# Patient Record
Sex: FEMALE | HISPANIC OR LATINO | Employment: OTHER
[De-identification: names, ages, dates, MRNs, and addresses within clinical notes are randomized per-mention and may not be internally consistent; named-entity substitution may affect disease eponyms.]

---

## 2017-07-29 ENCOUNTER — HEALTH MAINTENANCE LETTER (OUTPATIENT)
Age: 47
End: 2017-07-29

## 2022-12-30 ENCOUNTER — HOSPITAL ENCOUNTER (EMERGENCY)
Facility: CLINIC | Age: 52
Discharge: HOME | End: 2022-12-31
Attending: INTERNAL MEDICINE | Admitting: INTERNAL MEDICINE
Payer: COMMERCIAL

## 2022-12-30 DIAGNOSIS — R10.84 ABDOMINAL PAIN, GENERALIZED: ICD-10-CM

## 2022-12-30 DIAGNOSIS — R19.7 DIARRHEA, UNSPECIFIED TYPE: ICD-10-CM

## 2022-12-30 PROCEDURE — 99285 EMERGENCY DEPT VISIT HI MDM: CPT | Mod: 25

## 2022-12-30 PROCEDURE — 96361 HYDRATE IV INFUSION ADD-ON: CPT

## 2022-12-30 PROCEDURE — 96374 THER/PROPH/DIAG INJ IV PUSH: CPT | Mod: 59

## 2022-12-30 PROCEDURE — 96375 TX/PRO/DX INJ NEW DRUG ADDON: CPT

## 2022-12-30 PROCEDURE — 99284 EMERGENCY DEPT VISIT MOD MDM: CPT | Performed by: INTERNAL MEDICINE

## 2022-12-30 RX ORDER — KETOROLAC TROMETHAMINE 30 MG/ML
30 INJECTION, SOLUTION INTRAMUSCULAR; INTRAVENOUS ONCE
Status: COMPLETED | OUTPATIENT
Start: 2022-12-30 | End: 2022-12-31

## 2022-12-30 RX ORDER — SODIUM CHLORIDE 9 MG/ML
INJECTION, SOLUTION INTRAVENOUS CONTINUOUS
Status: DISCONTINUED | OUTPATIENT
Start: 2022-12-31 | End: 2022-12-31 | Stop reason: HOSPADM

## 2022-12-30 RX ORDER — METOCLOPRAMIDE HYDROCHLORIDE 5 MG/ML
5 INJECTION INTRAMUSCULAR; INTRAVENOUS ONCE
Status: COMPLETED | OUTPATIENT
Start: 2022-12-30 | End: 2022-12-31

## 2022-12-31 ENCOUNTER — APPOINTMENT (OUTPATIENT)
Dept: CT IMAGING | Facility: CLINIC | Age: 52
End: 2022-12-31
Attending: INTERNAL MEDICINE
Payer: COMMERCIAL

## 2022-12-31 VITALS
RESPIRATION RATE: 16 BRPM | HEART RATE: 94 BPM | SYSTOLIC BLOOD PRESSURE: 105 MMHG | DIASTOLIC BLOOD PRESSURE: 77 MMHG | OXYGEN SATURATION: 97 % | TEMPERATURE: 97.5 F | WEIGHT: 177 LBS

## 2022-12-31 LAB
ALBUMIN SERPL-MCNC: 3.8 G/DL (ref 3.4–5)
ALBUMIN UR-MCNC: NEGATIVE MG/DL
ALP SERPL-CCNC: 95 U/L (ref 40–150)
ALT SERPL W P-5'-P-CCNC: 32 U/L (ref 0–50)
ANION GAP SERPL CALCULATED.3IONS-SCNC: 4 MMOL/L (ref 3–14)
APPEARANCE UR: CLEAR
AST SERPL W P-5'-P-CCNC: 22 U/L (ref 0–45)
BASOPHILS # BLD AUTO: 0 10E3/UL (ref 0–0.2)
BASOPHILS NFR BLD AUTO: 1 %
BILIRUB SERPL-MCNC: 0.3 MG/DL (ref 0.2–1.3)
BILIRUB UR QL STRIP: NEGATIVE
BUN SERPL-MCNC: 17 MG/DL (ref 7–30)
CALCIUM SERPL-MCNC: 9.3 MG/DL (ref 8.5–10.1)
CHLORIDE BLD-SCNC: 108 MMOL/L (ref 94–109)
CO2 SERPL-SCNC: 28 MMOL/L (ref 20–32)
COLOR UR AUTO: ABNORMAL
CREAT SERPL-MCNC: 0.81 MG/DL (ref 0.52–1.04)
CRP SERPL-MCNC: <2.9 MG/L (ref 0–8)
EOSINOPHIL # BLD AUTO: 0.1 10E3/UL (ref 0–0.7)
EOSINOPHIL NFR BLD AUTO: 1 %
ERYTHROCYTE [DISTWIDTH] IN BLOOD BY AUTOMATED COUNT: 13.4 % (ref 10–15)
ERYTHROCYTE [SEDIMENTATION RATE] IN BLOOD BY WESTERGREN METHOD: 38 MM/HR (ref 0–30)
GFR SERPL CREATININE-BSD FRML MDRD: 86 ML/MIN/1.73M2
GLUCOSE BLD-MCNC: 98 MG/DL (ref 70–99)
GLUCOSE UR STRIP-MCNC: NEGATIVE MG/DL
HCT VFR BLD AUTO: 37 % (ref 35–47)
HGB BLD-MCNC: 11.6 G/DL (ref 11.7–15.7)
HGB UR QL STRIP: NEGATIVE
IMM GRANULOCYTES # BLD: 0 10E3/UL
IMM GRANULOCYTES NFR BLD: 0 %
INR PPP: 0.9 (ref 0.85–1.15)
KETONES UR STRIP-MCNC: NEGATIVE MG/DL
LEUKOCYTE ESTERASE UR QL STRIP: ABNORMAL
LIPASE SERPL-CCNC: 130 U/L (ref 73–393)
LYMPHOCYTES # BLD AUTO: 2.2 10E3/UL (ref 0.8–5.3)
LYMPHOCYTES NFR BLD AUTO: 37 %
MCH RBC QN AUTO: 26.9 PG (ref 26.5–33)
MCHC RBC AUTO-ENTMCNC: 31.4 G/DL (ref 31.5–36.5)
MCV RBC AUTO: 86 FL (ref 78–100)
MONOCYTES # BLD AUTO: 0.5 10E3/UL (ref 0–1.3)
MONOCYTES NFR BLD AUTO: 9 %
MUCOUS THREADS #/AREA URNS LPF: PRESENT /LPF
NEUTROPHILS # BLD AUTO: 3.2 10E3/UL (ref 1.6–8.3)
NEUTROPHILS NFR BLD AUTO: 52 %
NITRATE UR QL: NEGATIVE
NRBC # BLD AUTO: 0 10E3/UL
NRBC BLD AUTO-RTO: 0 /100
PH UR STRIP: 5.5 [PH] (ref 5–7)
PLATELET # BLD AUTO: 208 10E3/UL (ref 150–450)
POTASSIUM BLD-SCNC: 3.9 MMOL/L (ref 3.4–5.3)
PROT SERPL-MCNC: 8 G/DL (ref 6.8–8.8)
RBC # BLD AUTO: 4.32 10E6/UL (ref 3.8–5.2)
RBC URINE: <1 /HPF
SODIUM SERPL-SCNC: 140 MMOL/L (ref 133–144)
SP GR UR STRIP: 1.02 (ref 1–1.03)
SQUAMOUS EPITHELIAL: 4 /HPF
TRANSITIONAL EPI: <1 /HPF
UROBILINOGEN UR STRIP-MCNC: NORMAL MG/DL
WBC # BLD AUTO: 6 10E3/UL (ref 4–11)
WBC URINE: 3 /HPF

## 2022-12-31 PROCEDURE — 81001 URINALYSIS AUTO W/SCOPE: CPT | Performed by: INTERNAL MEDICINE

## 2022-12-31 PROCEDURE — 36415 COLL VENOUS BLD VENIPUNCTURE: CPT | Performed by: INTERNAL MEDICINE

## 2022-12-31 PROCEDURE — 250N000009 HC RX 250: Performed by: INTERNAL MEDICINE

## 2022-12-31 PROCEDURE — 80053 COMPREHEN METABOLIC PANEL: CPT | Performed by: INTERNAL MEDICINE

## 2022-12-31 PROCEDURE — 258N000003 HC RX IP 258 OP 636: Performed by: INTERNAL MEDICINE

## 2022-12-31 PROCEDURE — 86140 C-REACTIVE PROTEIN: CPT | Performed by: INTERNAL MEDICINE

## 2022-12-31 PROCEDURE — 250N000013 HC RX MED GY IP 250 OP 250 PS 637: Performed by: EMERGENCY MEDICINE

## 2022-12-31 PROCEDURE — 83690 ASSAY OF LIPASE: CPT | Performed by: INTERNAL MEDICINE

## 2022-12-31 PROCEDURE — 250N000011 HC RX IP 250 OP 636: Performed by: INTERNAL MEDICINE

## 2022-12-31 PROCEDURE — 85652 RBC SED RATE AUTOMATED: CPT | Performed by: INTERNAL MEDICINE

## 2022-12-31 PROCEDURE — 74177 CT ABD & PELVIS W/CONTRAST: CPT

## 2022-12-31 PROCEDURE — 85025 COMPLETE CBC W/AUTO DIFF WBC: CPT | Performed by: INTERNAL MEDICINE

## 2022-12-31 PROCEDURE — 85610 PROTHROMBIN TIME: CPT | Performed by: INTERNAL MEDICINE

## 2022-12-31 RX ORDER — ACETAMINOPHEN 325 MG/1
650 TABLET ORAL ONCE
Status: COMPLETED | OUTPATIENT
Start: 2022-12-31 | End: 2022-12-31

## 2022-12-31 RX ORDER — IOPAMIDOL 755 MG/ML
100 INJECTION, SOLUTION INTRAVASCULAR ONCE
Status: COMPLETED | OUTPATIENT
Start: 2022-12-31 | End: 2022-12-31

## 2022-12-31 RX ORDER — METOCLOPRAMIDE 5 MG/1
5 TABLET ORAL 4 TIMES DAILY PRN
Qty: 10 TABLET | Refills: 0 | Status: SHIPPED | OUTPATIENT
Start: 2022-12-31 | End: 2024-05-05

## 2022-12-31 RX ORDER — DICYCLOMINE HCL 20 MG
20 TABLET ORAL ONCE
Status: COMPLETED | OUTPATIENT
Start: 2022-12-31 | End: 2022-12-31

## 2022-12-31 RX ORDER — LOPERAMIDE HCL 2 MG
2 CAPSULE ORAL ONCE
Status: COMPLETED | OUTPATIENT
Start: 2022-12-31 | End: 2022-12-31

## 2022-12-31 RX ADMIN — LOPERAMIDE HYDROCHLORIDE 2 MG: 2 CAPSULE ORAL at 03:12

## 2022-12-31 RX ADMIN — IOPAMIDOL 86 ML: 755 INJECTION, SOLUTION INTRAVENOUS at 01:34

## 2022-12-31 RX ADMIN — ACETAMINOPHEN 650 MG: 325 TABLET, FILM COATED ORAL at 03:12

## 2022-12-31 RX ADMIN — DICYCLOMINE HYDROCHLORIDE 20 MG: 20 TABLET ORAL at 03:12

## 2022-12-31 RX ADMIN — METOCLOPRAMIDE HYDROCHLORIDE 5 MG: 5 INJECTION INTRAMUSCULAR; INTRAVENOUS at 00:53

## 2022-12-31 RX ADMIN — KETOROLAC TROMETHAMINE 30 MG: 30 INJECTION, SOLUTION INTRAMUSCULAR; INTRAVENOUS at 00:52

## 2022-12-31 RX ADMIN — SODIUM CHLORIDE 1000 ML: 9 INJECTION, SOLUTION INTRAVENOUS at 00:52

## 2022-12-31 RX ADMIN — SODIUM CHLORIDE 86 ML: 9 INJECTION, SOLUTION INTRAVENOUS at 01:33

## 2022-12-31 ASSESSMENT — ENCOUNTER SYMPTOMS
DIARRHEA: 1
FEVER: 0
RECTAL PAIN: 0
BLOOD IN STOOL: 0
COLOR CHANGE: 0
ABDOMINAL PAIN: 1
VOMITING: 0
ARTHRALGIAS: 0
DIFFICULTY URINATING: 0
HEADACHES: 0
ANAL BLEEDING: 0
SHORTNESS OF BREATH: 0
NAUSEA: 0
NECK STIFFNESS: 0
CONSTIPATION: 0
EYE REDNESS: 0
CONFUSION: 0
ABDOMINAL DISTENTION: 1

## 2022-12-31 ASSESSMENT — ACTIVITIES OF DAILY LIVING (ADL)
ADLS_ACUITY_SCORE: 35
ADLS_ACUITY_SCORE: 35

## 2022-12-31 NOTE — ED PROVIDER NOTES
[facility] EMERGENCY DEPARTMENT ([facility])     December 30, 2022    ED Provider Note  [facility]      History     Chief Complaint   Patient presents with     Abdominal Pain     Reports since last Friday she has had generalized abdominal pain, nausea with intermittent vomiting on some days, has not had a good bowel movement since Friday and has been passing small amounts of loose stools, was on oral antibiotics 1-2 weeks ago for a vaginal infection     HPI  Sandy Kline is a 52 year old female with a past medical history significant for type 2 diabetes mellitus, hypertension, GERD, who presents to the Emergency Department for evaluation of abdominal pain. Patient reports generalized abdominal pain since last Friday, 12/23. She states she been having diarrhea, nausea with intermittent vomiting on some days. She states she has being going to the bathroom every 40 minutes. She states no one else has being sick. She states had coffee and a piece of bread earlier today. Endorses chills with no fever. Denies usual food or recent travel.     Past Medical History  Past Medical History:   Diagnosis Date     Diabetes mellitus (H)      Past Surgical History:   Procedure Laterality Date     carpel tunnel       ORTHOPEDIC SURGERY       metoclopramide (REGLAN) 5 MG tablet  AMOXICILLIN PO  ASPIRIN PO  BUPROPION HCL PO  calcium carb 1250 mg, 500 mg [place],/vitamin D 200 units (OSCAL WITH D) 500-200 MG-UNIT per tablet  FLUCONAZOLE PO  Gabapentin (NEURONTIN PO)  INSULIN ASPART SC  insulin glargine (LANTUS) SOLN 100 UNIT/ML  ketotifen (ZADITOR/REFRESH ANTI-ITCH) 0.025 % SOLN  neomycin-polymyxin-hydrocortisone (CORTISPORIN) otic solution  OMEPRAZOLE PO  sennosides (SENOKOT) 8.6 MG tablet  SIMVASTATIN PO  VITAMIN D, CHOLECALCIFEROL, PO      Allergies   Allergen Reactions     Pineapple Rash     Family History  No family history on file.  Social History   Social History     Tobacco Use     Smoking  status: Every Day     Packs/day: 0.25     Types: Cigarettes   Substance Use Topics     Alcohol use: No     Drug use: No      Past medical history, past surgical history, medications, allergies, family history, and social history were reviewed with the patient. No additional pertinent items.       Review of Systems   Constitutional: Negative for fever.   HENT: Negative for congestion.    Eyes: Negative for redness.   Respiratory: Negative for shortness of breath.    Cardiovascular: Negative for chest pain.   Gastrointestinal: Positive for abdominal distention, abdominal pain and diarrhea. Negative for anal bleeding, blood in stool, constipation, nausea, rectal pain and vomiting.   Genitourinary: Negative for difficulty urinating.   Musculoskeletal: Negative for arthralgias and neck stiffness.   Skin: Negative for color change.   Neurological: Negative for headaches.   Psychiatric/Behavioral: Negative for confusion.     A complete review of systems was performed with pertinent positives and negatives noted in the HPI, and all other systems negative.    Physical Exam   BP: 105/77  Pulse: 94  Temp: 98.5  F (36.9  C)  Resp: 16  Weight: 80.3 kg (177 lb)  SpO2: 97 %  Physical Exam  Constitutional:       General: She is not in acute distress.     Appearance: She is not diaphoretic.   HENT:      Head: Atraumatic.      Mouth/Throat:      Pharynx: No oropharyngeal exudate.   Eyes:      General: No scleral icterus.     Pupils: Pupils are equal, round, and reactive to light.   Cardiovascular:      Rate and Rhythm: Normal rate and regular rhythm.      Heart sounds: Normal heart sounds. No murmur heard.    No friction rub. No gallop.   Pulmonary:      Effort: Pulmonary effort is normal. No respiratory distress.      Breath sounds: Normal breath sounds. No stridor. No wheezing, rhonchi or rales.   Chest:      Chest wall: No tenderness.   Abdominal:      General: Bowel sounds are normal.      Palpations: Abdomen is soft.       Tenderness: There is generalized abdominal tenderness. There is no right CVA tenderness, left CVA tenderness, guarding or rebound. Negative signs include Pittman's sign, Rovsing's sign, McBurney's sign, psoas sign and obturator sign.      Hernia: No hernia is present.       Musculoskeletal:         General: No tenderness.      Cervical back: Neck supple.   Skin:     General: Skin is warm.      Findings: No rash.   Neurological:      General: No focal deficit present.      Cranial Nerves: No cranial nerve deficit.         ED Course      Procedures        11:29 PM  The patient was seen and examined by Anthony Bartholomew MD in Room ED06.                 Results for orders placed or performed during the hospital encounter of 12/30/22   INR     Status: Normal   Result Value Ref Range    INR 0.90 0.85 - 1.15   Comprehensive metabolic panel     Status: Normal   Result Value Ref Range    Sodium 140 133 - 144 mmol/L    Potassium 3.9 3.4 - 5.3 mmol/L    Chloride 108 94 - 109 mmol/L    Carbon Dioxide (CO2) 28 20 - 32 mmol/L    Anion Gap 4 3 - 14 mmol/L    Urea Nitrogen 17 7 - 30 mg/dL    Creatinine 0.81 0.52 - 1.04 mg/dL    Calcium 9.3 8.5 - 10.1 mg/dL    Glucose 98 70 - 99 mg/dL    Alkaline Phosphatase 95 40 - 150 U/L    AST 22 0 - 45 U/L    ALT 32 0 - 50 U/L    Protein Total 8.0 6.8 - 8.8 g/dL    Albumin 3.8 3.4 - 5.0 g/dL    Bilirubin Total 0.3 0.2 - 1.3 mg/dL    GFR Estimate 86 >60 mL/min/1.73m2   Lipase     Status: Normal   Result Value Ref Range    Lipase 130 73 - 393 U/L   CRP inflammation     Status: Normal   Result Value Ref Range    CRP Inflammation <2.9 0.0 - 8.0 mg/L   Erythrocyte sedimentation rate auto     Status: Abnormal   Result Value Ref Range    Erythrocyte Sedimentation Rate 38 (H) 0 - 30 mm/hr   UA with Microscopic reflex to Culture     Status: Abnormal    Specimen: Urine, Midstream   Result Value Ref Range    Color Urine Light Yellow Colorless, Straw, Light Yellow, Yellow    Appearance Urine Clear Clear     Glucose Urine Negative Negative mg/dL    Bilirubin Urine Negative Negative    Ketones Urine Negative Negative mg/dL    Specific Gravity Urine 1.020 1.003 - 1.035    Blood Urine Negative Negative    pH Urine 5.5 5.0 - 7.0    Protein Albumin Urine Negative Negative mg/dL    Urobilinogen Urine Normal Normal, 2.0 mg/dL    Nitrite Urine Negative Negative    Leukocyte Esterase Urine Trace (A) Negative    Mucus Urine Present (A) None Seen /LPF    RBC Urine <1 <=2 /HPF    WBC Urine 3 <=5 /HPF    Squamous Epithelials Urine 4 (H) <=1 /HPF    Transitional Epithelials Urine <1 <=1 /HPF    Narrative    Urine Culture not indicated   CBC with platelets and differential     Status: Abnormal   Result Value Ref Range    WBC Count 6.0 4.0 - 11.0 10e3/uL    RBC Count 4.32 3.80 - 5.20 10e6/uL    Hemoglobin 11.6 (L) 11.7 - 15.7 g/dL    Hematocrit 37.0 35.0 - 47.0 %    MCV 86 78 - 100 fL    MCH 26.9 26.5 - 33.0 pg    MCHC 31.4 (L) 31.5 - 36.5 g/dL    RDW 13.4 10.0 - 15.0 %    Platelet Count 208 150 - 450 10e3/uL    % Neutrophils 52 %    % Lymphocytes 37 %    % Monocytes 9 %    % Eosinophils 1 %    % Basophils 1 %    % Immature Granulocytes 0 %    NRBCs per 100 WBC 0 <1 /100    Absolute Neutrophils 3.2 1.6 - 8.3 10e3/uL    Absolute Lymphocytes 2.2 0.8 - 5.3 10e3/uL    Absolute Monocytes 0.5 0.0 - 1.3 10e3/uL    Absolute Eosinophils 0.1 0.0 - 0.7 10e3/uL    Absolute Basophils 0.0 0.0 - 0.2 10e3/uL    Absolute Immature Granulocytes 0.0 <=0.4 10e3/uL    Absolute NRBCs 0.0 10e3/uL   CBC with platelets differential     Status: Abnormal    Narrative    The following orders were created for panel order CBC with platelets differential.  Procedure                               Abnormality         Status                     ---------                               -----------         ------                     CBC with platelets and d...[[id number]]  Abnormal            Final result                 Please view results for these tests on the individual  orders.     Medications   0.9% sodium chloride BOLUS (1,000 mLs Intravenous New Bag 12/31/22 0052)     Followed by   sodium chloride 0.9% infusion (has no administration in time range)   metoclopramide (REGLAN) injection 5 mg (5 mg Intravenous Given 12/31/22 0053)   ketorolac (TORADOL) injection 30 mg (30 mg Intravenous Given 12/31/22 0052)   iopamidol (ISOVUE-370) solution 100 mL (86 mLs Intravenous Given 12/31/22 0134)   sodium chloride 0.9 % bag 100mL for CT scan flush use (86 mLs Intravenous Given 12/31/22 0133)        Assessments & Plan (with Medical Decision Making)  Acute diarrhea of one week, unclear etiology but clinically viral, no unusual food or travel, labs without acute changes, awaiting stool and CT results. Improving with toradol reglan and NS. Plan to discharge with reglan prn, provided CT without acute pathologies. Will sign off to incoming EP.       I have reviewed the nursing notes. I have reviewed the findings, diagnosis, plan and need for follow up with the patient.    Medical Decision Making  The patient presented with a problem that is an acute illness with systemic symptoms.    The patient's evaluation involved:  ordering and review of 3+ test(s) (see separate area of note for details)    The patient's management involved prescription drug management.        New Prescriptions    METOCLOPRAMIDE (REGLAN) 5 MG TABLET    Take 1 tablet (5 mg) by mouth 4 times daily as needed (nausea vomiting diarrhea)       Final diagnoses:   Diarrhea, unspecified type   Abdominal pain, generalized   I, Maria Del Carmen Seigel, am serving as a trained medical scribe to document services personally performed by Anthony Bartholomew MD, based on the provider's statements to me.      IAnthony MD, was physically present and have reviewed and verified the accuracy of this note documented by MariaD el Carmen Siegel.     --  Anthony Bartholomew MD  [facility] EMERGENCY DEPARTMENT  12/30/2022     Anthony Bartholomew MD  01/01/23  [id number]

## 2022-12-31 NOTE — ED NOTES
The patient was accepted at shift change signout with a plan for me to follow-up on CT, discharge if unremarkable.  CT was done, did not show any acute abnormality.  On repeat evaluation the patient states that she was starting to have more cramping.  She is given a dose of Imodium as well as Bentyl and Tylenol.  On repeat evaluation she states she feels much improved.  She will be discharged home with a prescription for Reglan as written by Dr. Espinoza.  She is encouraged to follow-up primary care this coming week, she states she already has an appointment scheduled.  She is encouraged to return with any new or worsening symptoms, any other concerns.  She verbalizes understanding and is agreeable to the plan.    Dictation Disclaimer: Some of this Note has been completed with voice-recognition dictation software. Although errors are generally corrected real-time, there is the potential for a rare error to be present in the completed chart.       Xi Ham MD  12/31/22 1089

## 2022-12-31 NOTE — DISCHARGE INSTRUCTIONS
Please make an appointment to follow up with Your Primary Care Provider in 3-7 days if not improving. You may also use tylenol as needed for pain.

## 2022-12-31 NOTE — ED TRIAGE NOTES
Triage Assessment     Row Name 12/30/22 2009       Triage Assessment (Adult)    Airway WDL WDL       Respiratory WDL    Respiratory WDL WDL       Skin Circulation/Temperature WDL    Skin Circulation/Temperature WDL WDL       Cardiac WDL    Cardiac WDL WDL       Peripheral/Neurovascular WDL    Peripheral Neurovascular WDL WDL       Cognitive/Neuro/Behavioral WDL    Cognitive/Neuro/Behavioral WDL WDL

## 2024-01-14 ENCOUNTER — HOSPITAL ENCOUNTER (EMERGENCY)
Facility: CLINIC | Age: 54
Discharge: HOME | End: 2024-01-15
Attending: EMERGENCY MEDICINE | Admitting: EMERGENCY MEDICINE
Payer: COMMERCIAL

## 2024-01-14 DIAGNOSIS — K31.84 GASTROPARESIS: ICD-10-CM

## 2024-01-14 LAB
ALBUMIN SERPL BCG-MCNC: 4.8 G/DL (ref 3.5–5.2)
ALP SERPL-CCNC: 76 U/L (ref 40–150)
ALT SERPL W P-5'-P-CCNC: 24 U/L (ref 0–50)
ANION GAP SERPL CALCULATED.3IONS-SCNC: 13 MMOL/L (ref 7–15)
AST SERPL W P-5'-P-CCNC: 32 U/L (ref 0–45)
BILIRUB SERPL-MCNC: 0.3 MG/DL
BUN SERPL-MCNC: 18.9 MG/DL (ref 6–20)
CALCIUM SERPL-MCNC: 10.3 MG/DL (ref 8.6–10)
CHLORIDE SERPL-SCNC: 100 MMOL/L (ref 98–107)
CREAT SERPL-MCNC: 0.97 MG/DL (ref 0.51–0.95)
DEPRECATED HCO3 PLAS-SCNC: 25 MMOL/L (ref 22–29)
EGFRCR SERPLBLD CKD-EPI 2021: 69 ML/MIN/1.73M2
ERYTHROCYTE [DISTWIDTH] IN BLOOD BY AUTOMATED COUNT: 17.6 % (ref 10–15)
GLUCOSE SERPL-MCNC: 133 MG/DL (ref 70–99)
HCT VFR BLD AUTO: 36.1 % (ref 35–47)
HGB BLD-MCNC: 11.8 G/DL (ref 11.7–15.7)
HOLD SPECIMEN: NORMAL
LIPASE SERPL-CCNC: 39 U/L (ref 13–60)
MAGNESIUM SERPL-MCNC: 1.2 MG/DL (ref 1.7–2.3)
MCH RBC QN AUTO: 26.2 PG (ref 26.5–33)
MCHC RBC AUTO-ENTMCNC: 32.7 G/DL (ref 31.5–36.5)
MCV RBC AUTO: 80 FL (ref 78–100)
PLATELET # BLD AUTO: 253 10E3/UL (ref 150–450)
POTASSIUM SERPL-SCNC: 4.3 MMOL/L (ref 3.4–5.3)
PROT SERPL-MCNC: 8.1 G/DL (ref 6.4–8.3)
RBC # BLD AUTO: 4.5 10E6/UL (ref 3.8–5.2)
SODIUM SERPL-SCNC: 138 MMOL/L (ref 135–145)
WBC # BLD AUTO: 6.1 10E3/UL (ref 4–11)

## 2024-01-14 PROCEDURE — 96361 HYDRATE IV INFUSION ADD-ON: CPT | Performed by: EMERGENCY MEDICINE

## 2024-01-14 PROCEDURE — 93010 ELECTROCARDIOGRAM REPORT: CPT | Performed by: EMERGENCY MEDICINE

## 2024-01-14 PROCEDURE — 85027 COMPLETE CBC AUTOMATED: CPT

## 2024-01-14 PROCEDURE — 250N000013 HC RX MED GY IP 250 OP 250 PS 637

## 2024-01-14 PROCEDURE — 83690 ASSAY OF LIPASE: CPT

## 2024-01-14 PROCEDURE — 250N000011 HC RX IP 250 OP 636: Mod: JZ

## 2024-01-14 PROCEDURE — 96375 TX/PRO/DX INJ NEW DRUG ADDON: CPT | Performed by: EMERGENCY MEDICINE

## 2024-01-14 PROCEDURE — 258N000003 HC RX IP 258 OP 636

## 2024-01-14 PROCEDURE — 99284 EMERGENCY DEPT VISIT MOD MDM: CPT | Mod: 25 | Performed by: EMERGENCY MEDICINE

## 2024-01-14 PROCEDURE — 83735 ASSAY OF MAGNESIUM: CPT

## 2024-01-14 PROCEDURE — 36415 COLL VENOUS BLD VENIPUNCTURE: CPT | Performed by: EMERGENCY MEDICINE

## 2024-01-14 PROCEDURE — 250N000013 HC RX MED GY IP 250 OP 250 PS 637: Performed by: EMERGENCY MEDICINE

## 2024-01-14 PROCEDURE — 93005 ELECTROCARDIOGRAM TRACING: CPT | Performed by: EMERGENCY MEDICINE

## 2024-01-14 PROCEDURE — 80053 COMPREHEN METABOLIC PANEL: CPT

## 2024-01-14 RX ORDER — PANTOPRAZOLE SODIUM 40 MG/1
40 TABLET, DELAYED RELEASE ORAL ONCE
Status: COMPLETED | OUTPATIENT
Start: 2024-01-14 | End: 2024-01-14

## 2024-01-14 RX ORDER — HYDROCODONE BITARTRATE AND ACETAMINOPHEN 5; 325 MG/1; MG/1
1 TABLET ORAL ONCE
Status: COMPLETED | OUTPATIENT
Start: 2024-01-14 | End: 2024-01-14

## 2024-01-14 RX ORDER — MAGNESIUM SULFATE HEPTAHYDRATE 40 MG/ML
4 INJECTION, SOLUTION INTRAVENOUS ONCE
Status: COMPLETED | OUTPATIENT
Start: 2024-01-15 | End: 2024-01-15

## 2024-01-14 RX ORDER — METOCLOPRAMIDE HYDROCHLORIDE 5 MG/ML
5 INJECTION INTRAMUSCULAR; INTRAVENOUS ONCE
Status: COMPLETED | OUTPATIENT
Start: 2024-01-14 | End: 2024-01-14

## 2024-01-14 RX ADMIN — METOCLOPRAMIDE 5 MG: 5 INJECTION, SOLUTION INTRAMUSCULAR; INTRAVENOUS at 21:08

## 2024-01-14 RX ADMIN — PANTOPRAZOLE SODIUM 40 MG: 40 TABLET, DELAYED RELEASE ORAL at 21:49

## 2024-01-14 RX ADMIN — SODIUM CHLORIDE, POTASSIUM CHLORIDE, SODIUM LACTATE AND CALCIUM CHLORIDE 1000 ML: 600; 310; 30; 20 INJECTION, SOLUTION INTRAVENOUS at 21:07

## 2024-01-14 RX ADMIN — HYDROCODONE BITARTRATE AND ACETAMINOPHEN 1 TABLET: 5; 325 TABLET ORAL at 21:49

## 2024-01-14 ASSESSMENT — ACTIVITIES OF DAILY LIVING (ADL)
ADLS_ACUITY_SCORE: 35
ADLS_ACUITY_SCORE: 35

## 2024-01-15 VITALS
HEART RATE: 84 BPM | WEIGHT: 170 LBS | DIASTOLIC BLOOD PRESSURE: 76 MMHG | OXYGEN SATURATION: 99 % | BODY MASS INDEX: 31.28 KG/M2 | HEIGHT: 62 IN | RESPIRATION RATE: 14 BRPM | SYSTOLIC BLOOD PRESSURE: 134 MMHG | TEMPERATURE: 97.8 F

## 2024-01-15 LAB
ATRIAL RATE - MUSE: 103 BPM
DIASTOLIC BLOOD PRESSURE - MUSE: NORMAL MMHG
INTERPRETATION ECG - MUSE: NORMAL
P AXIS - MUSE: 48 DEGREES
PR INTERVAL - MUSE: 118 MS
QRS DURATION - MUSE: 82 MS
QT - MUSE: 338 MS
QTC - MUSE: 442 MS
R AXIS - MUSE: 55 DEGREES
SYSTOLIC BLOOD PRESSURE - MUSE: NORMAL MMHG
T AXIS - MUSE: 49 DEGREES
VENTRICULAR RATE- MUSE: 103 BPM

## 2024-01-15 PROCEDURE — 250N000011 HC RX IP 250 OP 636: Performed by: EMERGENCY MEDICINE

## 2024-01-15 PROCEDURE — 96366 THER/PROPH/DIAG IV INF ADDON: CPT | Performed by: EMERGENCY MEDICINE

## 2024-01-15 PROCEDURE — 96365 THER/PROPH/DIAG IV INF INIT: CPT | Performed by: EMERGENCY MEDICINE

## 2024-01-15 RX ORDER — METOCLOPRAMIDE 5 MG/1
5 TABLET ORAL
Qty: 40 TABLET | Refills: 0 | Status: SHIPPED | OUTPATIENT
Start: 2024-01-15 | End: 2024-01-25

## 2024-01-15 RX ADMIN — MAGNESIUM SULFATE HEPTAHYDRATE 4 G: 40 INJECTION, SOLUTION INTRAVENOUS at 00:21

## 2024-01-15 ASSESSMENT — ACTIVITIES OF DAILY LIVING (ADL): ADLS_ACUITY_SCORE: 35

## 2024-01-15 NOTE — ED PROVIDER NOTES
"ED Provider Note  [facility]      History     Chief Complaint   Patient presents with    Abdominal Pain    Nausea, Vomiting, & Diarrhea    Pharyngitis     HPI  Sandy Kline is a 53 year old female, with a history significant for type 2 diabetes with diabetic gastroparesis chronic abdominal pain with intermittent nausea and vomiting, presented to the [facility] ED today for an evaluation of a 2-week history of nonbloody nonbilious emesis associated with food.    Patient has a longstanding history of chronic abdominal pain with intermittent nausea and and vomiting with food; she has been dealing with this issue for almost 3-1/2 years and occur sporadically but not every day.  For the for the past 2 weeks she endorses having nausea and vomiting with every meal she has consumed whether that is solid or liquid.  He reports that approximately 2 weeks ago, she was having \"cold-like\" symptoms that resolved in a span of 5 days; the nausea and emesis began around the same time.  She talked to her primary care provider and they recommended that she just take in Pedialyte she says she has not been able to tolerate.  Patient has not taken any other medications.  Denies fevers, chills.  Endorses having some headache after emesis as well as some dizziness with movement of her head and exertion that lasts a few minutes.  No chest pain, shortness of breath, weakness, paresthesias, gait imbalances.    She reports having close contact with her niece who is recovering from a viral illness.  No recent travels, no recent consumption of  foods, no recent hospitalizations/infections and no recent antibiotic use.  Patient has been taking Ozempic for the past 2 months preceding these new symptoms.    Past Medical History  Past Medical History:   Diagnosis Date    Diabetes mellitus (H)      Past Surgical History:   Procedure Laterality Date    carpel tunnel      ORTHOPEDIC SURGERY       AMOXICILLIN PO  ASPIRIN " "PO  BUPROPION HCL PO  calcium carb 1250 mg, 500 mg [place],/vitamin D 200 units (OSCAL WITH D) 500-200 MG-UNIT per tablet  FLUCONAZOLE PO  Gabapentin (NEURONTIN PO)  INSULIN ASPART SC  insulin glargine (LANTUS) SOLN 100 UNIT/ML  ketotifen (ZADITOR/REFRESH ANTI-ITCH) 0.025 % SOLN  metoclopramide (REGLAN) 5 MG tablet  neomycin-polymyxin-hydrocortisone (CORTISPORIN) otic solution  OMEPRAZOLE PO  sennosides (SENOKOT) 8.6 MG tablet  SIMVASTATIN PO  VITAMIN D, CHOLECALCIFEROL, PO      Allergies   Allergen Reactions    Pineapple Rash     Family History  No family history on file.  Social History   Social History     Tobacco Use    Smoking status: Every Day     Packs/day: .25     Types: Cigarettes   Substance Use Topics    Alcohol use: No    Drug use: No      Past medical history, past surgical history, medications, allergies, family history, and social history were reviewed with the patient. No additional pertinent items.      A medically appropriate review of systems was performed with pertinent positives and negatives noted in the HPI, and all other systems negative.    Physical Exam   BP: 131/88  Pulse: 115  Temp: 97.8  F (36.6  C)  Resp: 21  Height: 157.5 cm (5' 2\")  Weight: 77.1 kg (170 lb)  SpO2: 99 %    GENERAL: Non-toxic-appearing, Alert, comfortable, NAD  HEENT: EOMI,  anicteric sclera, Dry mucous membranes, Oropharynx clear  CV: Tachycardic, regular rhythm, normal S1 S2, no m/r/extra heart sounds  RESP: lungs clear to auscultation - no rales, rhonchi or wheezes  ABDOMEN:  soft, minimally tender in upper abdomen, -no guarding or rebound tenderness, +BS.   NEURO: Oriented x 3, CN II-XII intact, 5/5 motor strength in BUEs & LUEs, normal sensation throughout, 2/4 reflexes  SKIN: no suspicious lesions or rashes on exposed skin.  Subtle horizontal nystagmus from [place]-Hallpike maneuver  PSYCH: Appropriate mood and affect to match      ED Course, Procedures, & Data      Procedures                  Results for orders placed " or performed during the hospital encounter of 01/14/24   Jerome Draw     Status: None ()    Narrative    The following orders were created for panel order Jerome Draw.  Procedure                               Abnormality         Status                     ---------                               -----------         ------                     Extra Blue Top Tube[[id number]]                                                         Extra Red Top Tube[[id number]]                                                          Extra Green Top (Lithium...[[id number]]                                                 Extra Purple Top Tube[[id number]]                                                         Please view results for these tests on the individual orders.     Medications   lactated ringers BOLUS 1,000 mL (has no administration in time range)   pantoprazole (PROTONIX) EC tablet 40 mg (has no administration in time range)   metoclopramide (REGLAN) injection 5 mg (has no administration in time range)     Labs Ordered and Resulted from Time of ED Arrival to Time of ED Departure - No data to display  No orders to display              Assessment & Plan    Sandy Kline is a 53 year old female, with a history significant for type 2 diabetes with diabetic gastroparesis chronic abdominal pain with intermittent nausea and vomiting, presented to the [facility] ED today for an evaluation of a 2-week history of nonbloody nonbilious emesis associated with food.  Given the clinical degree and physical examination findings, the suspected etiology is worsening gastroparesis secondary to her recent viral infection.  However, the other differentials include small bowel obstruction however patient does not have any recent abdominal procedures and is passing gas and having bowel movements, increased intracranial pressure given headaches and dizziness, possible BPPV given clinical history and positive [place]-Hallpike maneuver.  No new medications to  explain worsening GI symptoms.  Will check CBC, lites, lipase, and LFTs.  Will rehydrated with 1L lactated ringers.also start Reglan 5 mg IV as previously has been noted to help with her symptoms.  Will also give 40 mg IV pantoprazole.  Plan to reassess patient after these initial interventions; if no improvement in symptomology then we will consider getting a CT head and/or Abdomen for further evaluation.     Lab work shows magnesium of 1.2, no other acute abnormalities.  Patient was given a IV fluids Reglan and pantoprazole and had an improvement in symptoms.  Magnesium was replaced.  With resolution of symptoms, BPPV is less likely.  I discussed results with patient.  We will discharge on a course of metoclopramide to take with meals.  We will discharge with return precautions.    --    ED Attending Physician Attestation    I Raphael Cotto DO, cared for this patient with the Resident. I have performed a history and physical examination of the patient and discussed management with the resident. I reviewed the resident's documentation above and agree with the documented findings and plan of care.    Summary of HPI, PE, ED Course   Patient is a 53 year old female evaluated in the emergency department for nausea and vomiting. Exam and ED course notable for possible gastroparesis.  Magnesium was noted to be low.  After the completion of care in the emergency department, the patient was discharged.        Raphael Cotto DO  Emergency Medicine      I have reviewed the nursing notes. I have reviewed the findings, diagnosis, plan and need for follow up with the patient.  Patient's case discussed with attending provider, Dr. Raphael Heredia who agrees with the assessment and plan stated above.    New Prescriptions    No medications on file       Final diagnoses:   None       Raphael Cotto DO  [facility] EMERGENCY DEPARTMENT  1/14/2024     Raphael Cotto DO  01/15/24 0058

## 2024-01-15 NOTE — ED TRIAGE NOTES
Patient states for two weeks she was coughing a lot and her throat hurt a lot, and fever and chills, with the meds it went away. Her throat still hurts. Everything she eats and drinks she vomits and she has diarrhea. The diarrhea is now less. She has abdominal pain.   She talked to her doctor and they said she needs to go to the hospital to get IV fluids.

## 2024-05-05 ENCOUNTER — APPOINTMENT (OUTPATIENT)
Dept: CT IMAGING | Facility: CLINIC | Age: 54
End: 2024-05-05
Attending: EMERGENCY MEDICINE
Payer: COMMERCIAL

## 2024-05-05 ENCOUNTER — HOSPITAL ENCOUNTER (OUTPATIENT)
Facility: CLINIC | Age: 54
Setting detail: OBSERVATION
Discharge: HOME | End: 2024-05-07
Attending: EMERGENCY MEDICINE | Admitting: EMERGENCY MEDICINE
Payer: COMMERCIAL

## 2024-05-05 ENCOUNTER — APPOINTMENT (OUTPATIENT)
Dept: GENERAL RADIOLOGY | Facility: CLINIC | Age: 54
End: 2024-05-05
Attending: EMERGENCY MEDICINE
Payer: COMMERCIAL

## 2024-05-05 DIAGNOSIS — R11.0 NAUSEA: Primary | ICD-10-CM

## 2024-05-05 DIAGNOSIS — R10.9 LEFT FLANK PAIN: ICD-10-CM

## 2024-05-05 LAB
ALBUMIN SERPL BCG-MCNC: 4.5 G/DL (ref 3.5–5.2)
ALBUMIN UR-MCNC: NEGATIVE MG/DL
ALP SERPL-CCNC: 89 U/L (ref 40–150)
ALT SERPL W P-5'-P-CCNC: 17 U/L (ref 0–50)
ANION GAP SERPL CALCULATED.3IONS-SCNC: 14 MMOL/L (ref 7–15)
APPEARANCE UR: CLEAR
AST SERPL W P-5'-P-CCNC: 23 U/L (ref 0–45)
ATRIAL RATE - MUSE: 88 BPM
BASOPHILS # BLD AUTO: 0 10E3/UL (ref 0–0.2)
BASOPHILS NFR BLD AUTO: 1 %
BILIRUB SERPL-MCNC: 0.3 MG/DL
BILIRUB UR QL STRIP: NEGATIVE
BUN SERPL-MCNC: 19.1 MG/DL (ref 6–20)
CALCIUM SERPL-MCNC: 9.5 MG/DL (ref 8.6–10)
CHLORIDE SERPL-SCNC: 102 MMOL/L (ref 98–107)
COLOR UR AUTO: ABNORMAL
CREAT SERPL-MCNC: 0.76 MG/DL (ref 0.51–0.95)
D DIMER PPP FEU-MCNC: 0.49 UG/ML FEU (ref 0–0.5)
D DIMER PPP FEU-MCNC: 0.57 UG/ML FEU (ref 0–0.5)
DEPRECATED HCO3 PLAS-SCNC: 23 MMOL/L (ref 22–29)
DIASTOLIC BLOOD PRESSURE - MUSE: NORMAL MMHG
EGFRCR SERPLBLD CKD-EPI 2021: >90 ML/MIN/1.73M2
EOSINOPHIL # BLD AUTO: 0.1 10E3/UL (ref 0–0.7)
EOSINOPHIL NFR BLD AUTO: 1 %
ERYTHROCYTE [DISTWIDTH] IN BLOOD BY AUTOMATED COUNT: 13.1 % (ref 10–15)
GLUCOSE BLD-MCNC: 45 MG/DL (ref 70–99)
GLUCOSE BLDC GLUCOMTR-MCNC: 100 MG/DL (ref 70–99)
GLUCOSE BLDC GLUCOMTR-MCNC: 43 MG/DL (ref 70–99)
GLUCOSE BLDC GLUCOMTR-MCNC: 59 MG/DL (ref 70–99)
GLUCOSE BLDC GLUCOMTR-MCNC: 74 MG/DL (ref 70–99)
GLUCOSE BLDC GLUCOMTR-MCNC: 96 MG/DL (ref 70–99)
GLUCOSE SERPL-MCNC: 66 MG/DL (ref 70–99)
GLUCOSE UR STRIP-MCNC: NEGATIVE MG/DL
HCT VFR BLD AUTO: 35.1 % (ref 35–47)
HGB BLD-MCNC: 11.4 G/DL (ref 11.7–15.7)
HGB UR QL STRIP: NEGATIVE
HOLD SPECIMEN: NORMAL
IMM GRANULOCYTES # BLD: 0 10E3/UL
IMM GRANULOCYTES NFR BLD: 0 %
INTERPRETATION ECG - MUSE: NORMAL
KETONES UR STRIP-MCNC: NEGATIVE MG/DL
LACTATE SERPL-SCNC: 1.5 MMOL/L (ref 0.7–2)
LACTATE SERPL-SCNC: 2.4 MMOL/L (ref 0.7–2)
LEUKOCYTE ESTERASE UR QL STRIP: NEGATIVE
LIPASE SERPL-CCNC: 29 U/L (ref 13–60)
LYMPHOCYTES # BLD AUTO: 1.2 10E3/UL (ref 0.8–5.3)
LYMPHOCYTES NFR BLD AUTO: 20 %
MCH RBC QN AUTO: 27.5 PG (ref 26.5–33)
MCHC RBC AUTO-ENTMCNC: 32.5 G/DL (ref 31.5–36.5)
MCV RBC AUTO: 85 FL (ref 78–100)
MONOCYTES # BLD AUTO: 0.5 10E3/UL (ref 0–1.3)
MONOCYTES NFR BLD AUTO: 8 %
MUCOUS THREADS #/AREA URNS LPF: PRESENT /LPF
NEUTROPHILS # BLD AUTO: 4.3 10E3/UL (ref 1.6–8.3)
NEUTROPHILS NFR BLD AUTO: 70 %
NITRATE UR QL: NEGATIVE
NRBC # BLD AUTO: 0 10E3/UL
NRBC BLD AUTO-RTO: 0 /100
P AXIS - MUSE: 56 DEGREES
PH UR STRIP: 5.5 [PH] (ref 5–7)
PLATELET # BLD AUTO: 231 10E3/UL (ref 150–450)
POTASSIUM SERPL-SCNC: 4.1 MMOL/L (ref 3.4–5.3)
PR INTERVAL - MUSE: 122 MS
PROT SERPL-MCNC: 7.8 G/DL (ref 6.4–8.3)
QRS DURATION - MUSE: 84 MS
QT - MUSE: 370 MS
QTC - MUSE: 447 MS
R AXIS - MUSE: 34 DEGREES
RBC # BLD AUTO: 4.14 10E6/UL (ref 3.8–5.2)
RBC URINE: 1 /HPF
SODIUM SERPL-SCNC: 139 MMOL/L (ref 135–145)
SP GR UR STRIP: 1.02 (ref 1–1.03)
SQUAMOUS EPITHELIAL: 1 /HPF
SYSTOLIC BLOOD PRESSURE - MUSE: NORMAL MMHG
T AXIS - MUSE: 61 DEGREES
TROPONIN T SERPL HS-MCNC: 11 NG/L
TROPONIN T SERPL HS-MCNC: 8 NG/L
UROBILINOGEN UR STRIP-MCNC: NORMAL MG/DL
VENTRICULAR RATE- MUSE: 88 BPM
WBC # BLD AUTO: 6.1 10E3/UL (ref 4–11)
WBC URINE: 2 /HPF

## 2024-05-05 PROCEDURE — 96376 TX/PRO/DX INJ SAME DRUG ADON: CPT

## 2024-05-05 PROCEDURE — 36415 COLL VENOUS BLD VENIPUNCTURE: CPT | Performed by: EMERGENCY MEDICINE

## 2024-05-05 PROCEDURE — 93005 ELECTROCARDIOGRAM TRACING: CPT | Performed by: EMERGENCY MEDICINE

## 2024-05-05 PROCEDURE — 250N000013 HC RX MED GY IP 250 OP 250 PS 637: Performed by: PHYSICIAN ASSISTANT

## 2024-05-05 PROCEDURE — 96361 HYDRATE IV INFUSION ADD-ON: CPT | Performed by: EMERGENCY MEDICINE

## 2024-05-05 PROCEDURE — 99223 1ST HOSP IP/OBS HIGH 75: CPT | Mod: FS | Performed by: PHYSICIAN ASSISTANT

## 2024-05-05 PROCEDURE — 258N000003 HC RX IP 258 OP 636: Performed by: EMERGENCY MEDICINE

## 2024-05-05 PROCEDURE — 81001 URINALYSIS AUTO W/SCOPE: CPT | Performed by: EMERGENCY MEDICINE

## 2024-05-05 PROCEDURE — 99207 PR APP CREDIT; MD BILLING SHARED VISIT: CPT | Performed by: INTERNAL MEDICINE

## 2024-05-05 PROCEDURE — 74177 CT ABD & PELVIS W/CONTRAST: CPT

## 2024-05-05 PROCEDURE — 85025 COMPLETE CBC W/AUTO DIFF WBC: CPT | Performed by: EMERGENCY MEDICINE

## 2024-05-05 PROCEDURE — 82962 GLUCOSE BLOOD TEST: CPT

## 2024-05-05 PROCEDURE — 71275 CT ANGIOGRAPHY CHEST: CPT

## 2024-05-05 PROCEDURE — 99418 PROLNG IP/OBS E/M EA 15 MIN: CPT | Performed by: PHYSICIAN ASSISTANT

## 2024-05-05 PROCEDURE — 250N000011 HC RX IP 250 OP 636: Performed by: EMERGENCY MEDICINE

## 2024-05-05 PROCEDURE — 83690 ASSAY OF LIPASE: CPT | Performed by: EMERGENCY MEDICINE

## 2024-05-05 PROCEDURE — G0378 HOSPITAL OBSERVATION PER HR: HCPCS

## 2024-05-05 PROCEDURE — 71046 X-RAY EXAM CHEST 2 VIEWS: CPT

## 2024-05-05 PROCEDURE — 250N000009 HC RX 250: Performed by: EMERGENCY MEDICINE

## 2024-05-05 PROCEDURE — 99285 EMERGENCY DEPT VISIT HI MDM: CPT | Mod: 25 | Performed by: EMERGENCY MEDICINE

## 2024-05-05 PROCEDURE — 250N000011 HC RX IP 250 OP 636: Performed by: PHYSICIAN ASSISTANT

## 2024-05-05 PROCEDURE — 83605 ASSAY OF LACTIC ACID: CPT | Performed by: EMERGENCY MEDICINE

## 2024-05-05 PROCEDURE — 96374 THER/PROPH/DIAG INJ IV PUSH: CPT | Mod: 59 | Performed by: EMERGENCY MEDICINE

## 2024-05-05 PROCEDURE — 84484 ASSAY OF TROPONIN QUANT: CPT | Performed by: EMERGENCY MEDICINE

## 2024-05-05 PROCEDURE — 96376 TX/PRO/DX INJ SAME DRUG ADON: CPT | Mod: 59 | Performed by: EMERGENCY MEDICINE

## 2024-05-05 PROCEDURE — 80053 COMPREHEN METABOLIC PANEL: CPT | Performed by: EMERGENCY MEDICINE

## 2024-05-05 PROCEDURE — 96375 TX/PRO/DX INJ NEW DRUG ADDON: CPT | Mod: 59 | Performed by: EMERGENCY MEDICINE

## 2024-05-05 PROCEDURE — 86140 C-REACTIVE PROTEIN: CPT | Performed by: INTERNAL MEDICINE

## 2024-05-05 PROCEDURE — 93010 ELECTROCARDIOGRAM REPORT: CPT | Mod: 59 | Performed by: EMERGENCY MEDICINE

## 2024-05-05 PROCEDURE — 250N000013 HC RX MED GY IP 250 OP 250 PS 637: Performed by: EMERGENCY MEDICINE

## 2024-05-05 PROCEDURE — 85379 FIBRIN DEGRADATION QUANT: CPT | Performed by: EMERGENCY MEDICINE

## 2024-05-05 PROCEDURE — C9113 INJ PANTOPRAZOLE SODIUM, VIA: HCPCS | Performed by: PHYSICIAN ASSISTANT

## 2024-05-05 PROCEDURE — C9113 INJ PANTOPRAZOLE SODIUM, VIA: HCPCS | Performed by: EMERGENCY MEDICINE

## 2024-05-05 RX ORDER — NALOXONE HYDROCHLORIDE 0.4 MG/ML
0.2 INJECTION, SOLUTION INTRAMUSCULAR; INTRAVENOUS; SUBCUTANEOUS
Status: DISCONTINUED | OUTPATIENT
Start: 2024-05-05 | End: 2024-05-07 | Stop reason: HOSPADM

## 2024-05-05 RX ORDER — NALOXONE HYDROCHLORIDE 0.4 MG/ML
0.4 INJECTION, SOLUTION INTRAMUSCULAR; INTRAVENOUS; SUBCUTANEOUS
Status: DISCONTINUED | OUTPATIENT
Start: 2024-05-05 | End: 2024-05-07 | Stop reason: HOSPADM

## 2024-05-05 RX ORDER — ACETAMINOPHEN 325 MG/1
975 TABLET ORAL EVERY 6 HOURS PRN
Status: DISCONTINUED | OUTPATIENT
Start: 2024-05-05 | End: 2024-05-07 | Stop reason: HOSPADM

## 2024-05-05 RX ORDER — HYDROMORPHONE HCL IN WATER/PF 6 MG/30 ML
0.2 PATIENT CONTROLLED ANALGESIA SYRINGE INTRAVENOUS ONCE
Status: COMPLETED | OUTPATIENT
Start: 2024-05-05 | End: 2024-05-05

## 2024-05-05 RX ORDER — AMOXICILLIN 250 MG
2 CAPSULE ORAL 2 TIMES DAILY PRN
Status: DISCONTINUED | OUTPATIENT
Start: 2024-05-05 | End: 2024-05-07 | Stop reason: HOSPADM

## 2024-05-05 RX ORDER — ACETAMINOPHEN 325 MG/1
2 TABLET ORAL EVERY 4 HOURS PRN
COMMUNITY
Start: 2023-07-25

## 2024-05-05 RX ORDER — LISINOPRIL 2.5 MG/1
5 TABLET ORAL EVERY EVENING
Status: DISCONTINUED | OUTPATIENT
Start: 2024-05-05 | End: 2024-05-07 | Stop reason: HOSPADM

## 2024-05-05 RX ORDER — AMOXICILLIN 250 MG
1 CAPSULE ORAL 2 TIMES DAILY PRN
Status: DISCONTINUED | OUTPATIENT
Start: 2024-05-05 | End: 2024-05-07 | Stop reason: HOSPADM

## 2024-05-05 RX ORDER — ONDANSETRON 2 MG/ML
4 INJECTION INTRAMUSCULAR; INTRAVENOUS EVERY 6 HOURS PRN
Status: DISCONTINUED | OUTPATIENT
Start: 2024-05-05 | End: 2024-05-07 | Stop reason: HOSPADM

## 2024-05-05 RX ORDER — NICOTINE POLACRILEX 4 MG
15-30 LOZENGE BUCCAL
Status: DISCONTINUED | OUTPATIENT
Start: 2024-05-05 | End: 2024-05-07 | Stop reason: HOSPADM

## 2024-05-05 RX ORDER — HYDROMORPHONE HCL IN WATER/PF 6 MG/30 ML
0.2 PATIENT CONTROLLED ANALGESIA SYRINGE INTRAVENOUS
Status: DISCONTINUED | OUTPATIENT
Start: 2024-05-05 | End: 2024-05-05

## 2024-05-05 RX ORDER — HYDROMORPHONE HYDROCHLORIDE 1 MG/ML
.3-.5 INJECTION, SOLUTION INTRAMUSCULAR; INTRAVENOUS; SUBCUTANEOUS
Status: DISCONTINUED | OUTPATIENT
Start: 2024-05-05 | End: 2024-05-07 | Stop reason: HOSPADM

## 2024-05-05 RX ORDER — METFORMIN HYDROCHLORIDE 750 MG/1
1500 TABLET, EXTENDED RELEASE ORAL
COMMUNITY
Start: 2023-12-06

## 2024-05-05 RX ORDER — MAGNESIUM HYDROXIDE/ALUMINUM HYDROXICE/SIMETHICONE 120; 1200; 1200 MG/30ML; MG/30ML; MG/30ML
15 SUSPENSION ORAL ONCE
Status: COMPLETED | OUTPATIENT
Start: 2024-05-05 | End: 2024-05-05

## 2024-05-05 RX ORDER — LISINOPRIL 5 MG/1
5 TABLET ORAL DAILY
COMMUNITY

## 2024-05-05 RX ORDER — LIDOCAINE 40 MG/G
CREAM TOPICAL
Status: DISCONTINUED | OUTPATIENT
Start: 2024-05-05 | End: 2024-05-07 | Stop reason: HOSPADM

## 2024-05-05 RX ORDER — ONDANSETRON 4 MG/1
4 TABLET, ORALLY DISINTEGRATING ORAL EVERY 6 HOURS PRN
Status: DISCONTINUED | OUTPATIENT
Start: 2024-05-05 | End: 2024-05-07 | Stop reason: HOSPADM

## 2024-05-05 RX ORDER — IOPAMIDOL 755 MG/ML
100 INJECTION, SOLUTION INTRAVASCULAR ONCE
Status: COMPLETED | OUTPATIENT
Start: 2024-05-05 | End: 2024-05-05

## 2024-05-05 RX ORDER — OXYCODONE HYDROCHLORIDE 5 MG/1
5 TABLET ORAL EVERY 4 HOURS PRN
Status: DISCONTINUED | OUTPATIENT
Start: 2024-05-05 | End: 2024-05-05

## 2024-05-05 RX ORDER — OXYCODONE HYDROCHLORIDE 5 MG/1
5-10 TABLET ORAL EVERY 4 HOURS PRN
Status: DISCONTINUED | OUTPATIENT
Start: 2024-05-05 | End: 2024-05-07 | Stop reason: HOSPADM

## 2024-05-05 RX ORDER — DEXTROSE MONOHYDRATE 25 G/50ML
25-50 INJECTION, SOLUTION INTRAVENOUS
Status: DISCONTINUED | OUTPATIENT
Start: 2024-05-05 | End: 2024-05-07 | Stop reason: HOSPADM

## 2024-05-05 RX ORDER — OXYCODONE HYDROCHLORIDE 5 MG/1
5 TABLET ORAL ONCE
Status: COMPLETED | OUTPATIENT
Start: 2024-05-05 | End: 2024-05-05

## 2024-05-05 RX ORDER — METFORMIN HCL 500 MG
1500 TABLET, EXTENDED RELEASE 24 HR ORAL
Status: DISCONTINUED | OUTPATIENT
Start: 2024-05-05 | End: 2024-05-07 | Stop reason: HOSPADM

## 2024-05-05 RX ADMIN — OXYCODONE HYDROCHLORIDE 5 MG: 5 TABLET ORAL at 18:36

## 2024-05-05 RX ADMIN — HYDROMORPHONE HYDROCHLORIDE 0.2 MG: 0.2 INJECTION, SOLUTION INTRAMUSCULAR; INTRAVENOUS; SUBCUTANEOUS at 20:13

## 2024-05-05 RX ADMIN — HYDROMORPHONE HYDROCHLORIDE 0.2 MG: 0.2 INJECTION, SOLUTION INTRAMUSCULAR; INTRAVENOUS; SUBCUTANEOUS at 10:42

## 2024-05-05 RX ADMIN — SODIUM CHLORIDE 1000 ML: 9 INJECTION, SOLUTION INTRAVENOUS at 13:00

## 2024-05-05 RX ADMIN — Medication 5 MG: at 22:13

## 2024-05-05 RX ADMIN — HYDROMORPHONE HYDROCHLORIDE 0.2 MG: 0.2 INJECTION, SOLUTION INTRAMUSCULAR; INTRAVENOUS; SUBCUTANEOUS at 08:37

## 2024-05-05 RX ADMIN — PANTOPRAZOLE SODIUM 40 MG: 40 INJECTION, POWDER, FOR SOLUTION INTRAVENOUS at 20:06

## 2024-05-05 RX ADMIN — SODIUM CHLORIDE 60 ML: 9 INJECTION, SOLUTION INTRAVENOUS at 10:38

## 2024-05-05 RX ADMIN — HYDROMORPHONE HYDROCHLORIDE 0.5 MG: 1 INJECTION, SOLUTION INTRAMUSCULAR; INTRAVENOUS; SUBCUTANEOUS at 22:13

## 2024-05-05 RX ADMIN — ALUMINUM HYDROXIDE, MAGNESIUM HYDROXIDE, AND SIMETHICONE 15 ML: 1200; 120; 1200 SUSPENSION ORAL at 12:57

## 2024-05-05 RX ADMIN — LISINOPRIL 5 MG: 2.5 TABLET ORAL at 20:05

## 2024-05-05 RX ADMIN — IOPAMIDOL 54 ML: 755 INJECTION, SOLUTION INTRAVENOUS at 13:44

## 2024-05-05 RX ADMIN — SODIUM CHLORIDE 75 ML: 9 INJECTION, SOLUTION INTRAVENOUS at 13:45

## 2024-05-05 RX ADMIN — IOPAMIDOL 78 ML: 755 INJECTION, SOLUTION INTRAVENOUS at 10:38

## 2024-05-05 RX ADMIN — HYDROMORPHONE HYDROCHLORIDE 0.2 MG: 0.2 INJECTION, SOLUTION INTRAMUSCULAR; INTRAVENOUS; SUBCUTANEOUS at 16:52

## 2024-05-05 RX ADMIN — ACETAMINOPHEN 975 MG: 325 TABLET, FILM COATED ORAL at 22:12

## 2024-05-05 RX ADMIN — HYDROMORPHONE HYDROCHLORIDE 0.2 MG: 0.2 INJECTION, SOLUTION INTRAMUSCULAR; INTRAVENOUS; SUBCUTANEOUS at 09:06

## 2024-05-05 RX ADMIN — METFORMIN HYDROCHLORIDE 1500 MG: 500 TABLET, EXTENDED RELEASE ORAL at 18:30

## 2024-05-05 RX ADMIN — OXYCODONE HYDROCHLORIDE 5 MG: 5 TABLET ORAL at 12:59

## 2024-05-05 RX ADMIN — PANTOPRAZOLE SODIUM 40 MG: 40 INJECTION, POWDER, FOR SOLUTION INTRAVENOUS at 09:07

## 2024-05-05 RX ADMIN — SODIUM CHLORIDE 1000 ML: 9 INJECTION, SOLUTION INTRAVENOUS at 09:06

## 2024-05-05 ASSESSMENT — ACTIVITIES OF DAILY LIVING (ADL)
ADLS_ACUITY_SCORE: 35
ADLS_ACUITY_SCORE: 23
ADLS_ACUITY_SCORE: 35
ADLS_ACUITY_SCORE: 23

## 2024-05-05 ASSESSMENT — COLUMBIA-SUICIDE SEVERITY RATING SCALE - C-SSRS
2. HAVE YOU ACTUALLY HAD ANY THOUGHTS OF KILLING YOURSELF IN THE PAST MONTH?: NO
1. IN THE PAST MONTH, HAVE YOU WISHED YOU WERE DEAD OR WISHED YOU COULD GO TO SLEEP AND NOT WAKE UP?: NO
6. HAVE YOU EVER DONE ANYTHING, STARTED TO DO ANYTHING, OR PREPARED TO DO ANYTHING TO END YOUR LIFE?: NO

## 2024-05-05 NOTE — PLAN OF CARE
Transfer Type: [facility]  Transfer Triage Note    Originating unit: [facility] ED    Final intended location for transfer: [facility] surg or IMC, or ICU    Tele required:  No    Time of admission request: 2:42 PM    Current guidance: Patients [place] from ED to inpatient unit should be seen, orders written, and H/P signed by the hospital medicine team prior to transfer.    Anticipated primary team on unit: Team: Hospitalist     Patient added to Interhospital transfer list?  No    Brief case description:     54 year old female with PMH of gastritis, DMII, gastroparesis, and HTN who presented to the ED with epigastric abdominal pain with radiation into left shoulder/LUQ.    CXR negative, CT PE negative. Trop negative. EKG unremarkable and shows sinus rhythm.    LFTs unremarkable. CT AP shows presence of lower lumbar fusion hardware and degenerative changes but no other acute findings.    She has not had improvement with IV Dilaudid, Protonix, Maalox prescriptions. Put on IV PPI for c/f gastritis as well.    Remains quite uncomfortable overall. ED provider not comfortable with discharge. Discussed obs admission for monitoring overnight, pt and family agreeable.    Mingo Mclaughlin MD

## 2024-05-05 NOTE — MEDICATION SCRIBE - ADMISSION MEDICATION HISTORY
Medication Scribe Admission Medication History    Admission medication history is complete. The information provided in this note is only as accurate as the sources available at the time of the update.    Information Source(s): Patient, Hospital records, and CareEverywhere/SureScripts via in-person    Pertinent Information: Patient reports she is no longer allergic to pineapple, this was removed from her allergies list.    Changes made to PTA medication list:  Added: Acetaminophen 325 mg, 650 mg Q 4 Hrs PRN, Insulin Detemir 40 units subcutaneous AT BEDTIME, Lisinopril 5 mg Daily, Metformin  mg , 2 tablets daily, Semaglutide 1 mg subcutaneous once a week.  Deleted: Simvastatin 40 mg daily, Sennosides 8.6 mg, 2 tablets BID, neomycin-polymyxin-hydrocortisone otic solution 3 drops QID, metoclopramide 5 mg QID PRN, ketotifen 0.025% 1 drop in both eyes BID, insulin glargine 30 units subcutaneous at bedtime, insulin aspart 15 units subcutaneous TID, fluconazole 150 mg daily, bupropion 150 mg BID, amoxicillin 500 mg.  Changed: None    Allergies reviewed with patient and updates made in EHR: yes    Medication History Completed By: Georges Mejia MD 5/5/2024 3:22 PM    PTA Med List   Medication Sig Last Dose    acetaminophen (TYLENOL) 325 MG tablet Take 2 tablets by mouth every 4 hours as needed 5/4/2024 at pm    ASPIRIN PO Take 81 mg by mouth 5/4/2024 at pm    calcium carb 1250 mg, 500 mg [place],/vitamin D 200 units (OSCAL WITH D) 500-200 MG-UNIT per tablet Take 1 tablet by mouth 2 times daily (with meals) 5/4/2024 at pm    Gabapentin (NEURONTIN PO) Take 600 mg by mouth 3 times daily 5/4/2024 at pm    insulin detemir (LEVEMIR PEN) 100 UNIT/ML pen Inject 40 Units Subcutaneous at bedtime 5/4/2024 at hs    lisinopril (ZESTRIL) 5 MG tablet Take 5 mg by mouth daily 5/4/2024 at pm    metFORMIN (GLUCOPHAGE-XR) 750 MG 24 hr tablet Take 1,500 mg by mouth daily (with dinner) 5/4/2024 at pm    OMEPRAZOLE PO Take 40 mg by  mouth 2 times daily (before meals) 5/4/2024 at pm    semaglutide (OZEMPIC) 2 MG/1.5ML pen Inject 1 mg Subcutaneous every 7 days Past Week at unknown    VITAMIN D, CHOLECALCIFEROL, PO Take 1,000 Units by mouth daily 5/4/2024 at pm

## 2024-05-05 NOTE — H&P
[facility]    History and Physical - Hospitalist Service, GOLD TEAM        Date of Admission:  5/5/2024    Assessment & Plan   Ms. Sandy Kline is a 53 yo female with hx of HTN, IDDM, GERD, and chronic gastritis admitted to [facility] under Obs status after presenting to ED with one day hx of LUQ abd pain that radiates into L flank.     # Acute LUQ abd pain, L flank pain  Pain developed initially only in abd LUQ yesterday morning that now radiates around to L flank into today. Denies fever, chills, nausea and other assoc sxs. Workup in ED negative including normal trop, lipase, and CT scan C/A/P. Describes pain as burning sensation and painful area in dermatomal distribution that is very tender to touch on exam but no rash noted. Pt had chicken pox as child, so question pain from developing zoster, less likely from below chronic gastritis and reflux.  - Continue to monitor areas closely for the development of rash  - Pain: Dilaudid 0.2 mg IV q2hrs prn and oxycodone 5 mg q4hrs prn    # GERD  # Chronic gastritis  - Continue PTA BID PPI but continue IV formulation for now    # T2DM, on insulin   # Hypoglycemia  PTA on Levemir insulin 40 units qpm, metformin XR 1500 mg qpm and semaglutide. Most recent A1C 6.1% this past Jan. Initial BG here 46 but pt states has being eating and drinking as usual despite above pain.   - Holding PTA Levemir for now  - Continue PTA metformin  - PTA semaglutide not on [facility] formulary so start medium Novolog sliding scale  - Accuchecks TID before meals and at bedtime   - Hypoglycemic protocol    # HTN: BPs stable.    - Continue PTA lisinopril 5 mg daily with parameters to hold.        Observation Goals: -diagnostic tests and consults completed and resulted, -vital signs normal or at patient baseline, -adequate pain control on oral analgesics, Nurse to notify provider when observation goals have been met and patient is ready for discharge.  Diet: Regular  Diet Adult    DVT Prophylaxis: Pneumatic Compression Devices  Hoyt Catheter: Not present  Lines: None     Cardiac Monitoring: None  Code Status: Full Code      Disposition Plan     Medically Ready for Discharge: Anticipated Tomorrow     The patient's care was discussed with the Attending Physician, Dr. Trammell and Patient.    Alphonso Garcia PA-C  Hospitalist Service, [facility]  Securely message with [employer] (more info)  Text page via [facility] Paging/Directory   See signed in provider for up to date coverage information  ______________________________________________________________________    Chief Complaint   Acute LUQ abd pain radiating to L flank    History is obtained from the patient and electronic health record    History of Present Illness   Please refer to ED note by Dr. Vargas dated 5/5 for full details. In brief, Ms. Sandy Kline is a 55 yo female with hx of HTN, IDDM, GERD, and chronic gastritis admitted to [facility] under Obs status after presenting to ED with one day hx of LUQ abd pain that radiates into L flank.     Currently pt admits to severe LUQ abd pain that radiates to L lower back. Denies fever, chills, chest pain, SOB and other assoc sxs.       Past Medical History    Past Medical History:   Diagnosis Date    Diabetes mellitus (H)        Past Surgical History   Past Surgical History:   Procedure Laterality Date    carpel tunnel      ORTHOPEDIC SURGERY         Prior to Admission Medications   Prior to Admission Medications   Prescriptions Last Dose Informant Patient Reported? Taking?   ASPIRIN PO 5/4/2024 at pm Self Yes Yes   Sig: Take 81 mg by mouth   Gabapentin (NEURONTIN PO) 5/4/2024 at pm Self Yes Yes   Sig: Take 600 mg by mouth 3 times daily   OMEPRAZOLE PO 5/4/2024 at pm Self Yes Yes   Sig: Take 40 mg by mouth 2 times daily (before meals)   VITAMIN D, CHOLECALCIFEROL, PO 5/4/2024 at pm Self Yes Yes   Sig: Take 1,000 Units by mouth daily    acetaminophen (TYLENOL) 325 MG tablet 5/4/2024 at pm Self Yes Yes   Sig: Take 2 tablets by mouth every 4 hours as needed   calcium carb 1250 mg, 500 mg [place],/vitamin D 200 units (OSCAL WITH D) 500-200 MG-UNIT per tablet 5/4/2024 at pm Self Yes Yes   Sig: Take 1 tablet by mouth 2 times daily (with meals)   insulin detemir (LEVEMIR PEN) 100 UNIT/ML pen 5/4/2024 at hs Self Yes Yes   Sig: Inject 40 Units Subcutaneous at bedtime   lisinopril (ZESTRIL) 5 MG tablet 5/4/2024 at pm Self Yes Yes   Sig: Take 5 mg by mouth daily   metFORMIN (GLUCOPHAGE-XR) 750 MG 24 hr tablet 5/4/2024 at pm Self Yes Yes   Sig: Take 1,500 mg by mouth daily (with dinner)   semaglutide (OZEMPIC) 2 MG/1.5ML pen Past Week at unknown Self Yes Yes   Sig: Inject 1 mg Subcutaneous every 7 days      Facility-Administered Medications: None        Review of Systems    The 10 point Review of Systems is negative other than noted in the HPI or here.     Social History   I have reviewed this patient's social history and updated it with pertinent information if needed.  Social History     Tobacco Use    Smoking status: Every Day     Current packs/day: 0.25     Types: Cigarettes   Substance Use Topics    Alcohol use: No    Drug use: No         Family History           Allergies   No Known Allergies     Physical Exam   Vital Signs: Temp: 98  F (36.7  C) Temp src: Oral BP: 121/87 Pulse: 86   Resp: 18 SpO2: 100 %      Weight: 160 lbs 0 oz  GEN: In NAD  LUNGS: CTAB  CV: RRR  ABD: +BSs; SNTND  EXT: No BLE edema  SKIN: Area below L breast into L lower back very ttp with minimal touch but no erythema or vesicles noted.   NEURO: AAOx3; CNs grossly intact; No acute focal deficits noted.      Medical Decision Making       60 MINUTES SPENT BY ME on the date of service doing chart review, history, exam, documentation & further activities per the note.      Data   CMP  Recent Labs   Lab 05/05/24  1244 05/05/24  1205 05/05/24  1135 05/05/24  1128 05/05/24  0828   NA  --    --   --   --  139   POTASSIUM  --   --   --   --  4.1   CHLORIDE  --   --   --   --  102   CO2  --   --   --   --  23   ANIONGAP  --   --   --   --  14   GLC 74 59* 45* 43* 66*   BUN  --   --   --   --  19.1   CR  --   --   --   --  0.76   GFRESTIMATED  --   --   --   --  >90   DONOVAN  --   --   --   --  9.5   PROTTOTAL  --   --   --   --  7.8   ALBUMIN  --   --   --   --  4.5   BILITOTAL  --   --   --   --  0.3   ALKPHOS  --   --   --   --  89   AST  --   --   --   --  23   ALT  --   --   --   --  17     CBC  Recent Labs   Lab 05/05/24  0828   WBC 6.1   RBC 4.14   HGB 11.4*   HCT 35.1   MCV 85   MCH 27.5   MCHC 32.5   RDW 13.1

## 2024-05-05 NOTE — ED PROVIDER NOTES
"ED Provider Note  [facility]      History     Chief Complaint   Patient presents with    Abdominal Pain     HPI  Sandy Kline is a 54 year old female with hx of chronic gastritis, DM2, gastroparesis, GERD, hypertension, who presents to the ED with sudden onset epigastric that started at 11 am yesterday.  It worsened overnight but she didn't come in earlier because she thought it might go away.  She is now feeling pain into her left shoulder.  She denies n/v or stooling issues. No urinary concerns.       Physical Exam   BP: 121/87  Pulse: 87  Temp: 98  F (36.7  C)  Resp: 18  Height: 157.5 cm (5' 2\")  Weight: 72.6 kg (160 lb)  SpO2: 97 %  Physical Exam  Vitals and nursing note reviewed.   Constitutional:       General: Distressed: appears uncomfortable.      Appearance: She is obese. She is ill-appearing. She is not toxic-appearing.   HENT:      Head: Normocephalic and atraumatic.   Eyes:      Extraocular Movements: Extraocular movements intact.   Cardiovascular:      Rate and Rhythm: Normal rate and regular rhythm.      Heart sounds: Normal heart sounds.   Pulmonary:      Effort: Pulmonary effort is normal.      Breath sounds: Normal breath sounds.   Abdominal:      General: There is no distension. There are no signs of injury.      Palpations: Abdomen is soft. There is no shifting dullness, fluid wave, hepatomegaly, splenomegaly, mass or pulsatile mass.      Tenderness: There is abdominal tenderness in the epigastric area.      Hernia: No hernia is present.   Skin:     General: Skin is warm and dry.      Coloration: Skin is not cyanotic, jaundiced, mottled or pale.      Findings: No erythema or rash.   Neurological:      General: No focal deficit present.      Mental Status: She is alert.   Psychiatric:         Mood and Affect: Mood normal.         Behavior: Behavior normal.         Thought Content: Thought content normal.         Judgment: Judgment normal.           ED Course, " Procedures, & Data      Procedures            EKG Interpretation:      Interpreted by Marlene Kennedy MD  Time reviewed: 812  Symptoms at time of EKG: epigastric pain   Rhythm: normal sinus   Rate: normal  Axis: normal  Ectopy: none  Conduction: normal  ST Segments/ T Waves: No ST-T wave changes  Q Waves: none  Comparison to prior: Unchanged from 1/14/24    Clinical Impression: normal EKG          Results for orders placed or performed during the hospital encounter of 05/05/24   CT Abdomen Pelvis w Contrast     Status: None    Narrative    EXAM: CT ABDOMEN PELVIS W CONTRAST  LOCATION: [facility]  DATE: 5/5/2024    INDICATION: acute onset episgastric and luq pain radiating into left shoulder  COMPARISON: CT abdomen pelvis 12/31/2022  TECHNIQUE: CT scan of the abdomen and pelvis was performed following injection of IV contrast. Multiplanar reformats were obtained. Dose reduction techniques were used.  CONTRAST: 78 mL Isovue 370    FINDINGS:   LOWER CHEST: Normal.    HEPATOBILIARY: Normal liver contours. Focal fat deposition adjacent to the falciform. No worrisome liver lesions. Normal gallbladder. No biliary ductal dilation.    PANCREAS: Normal.    SPLEEN: Normal.    ADRENAL GLANDS: Normal.    KIDNEYS/BLADDER: Kidneys enhance symmetrically. Benign cyst in the right upper pole, which requires no follow-up. Renal vascular calcifications, however no urolithiasis or hydronephrosis. Urinary bladder is unremarkable.    BOWEL: Normal caliber small and large bowel. No acute inflammatory changes. Scattered colonic diverticulosis. Normal appendix. No free fluid or free air.    LYMPH NODES: No lymphadenopathy.    VASCULATURE: The abdominal aorta is nonaneurysmal with moderate atheromatous disease.    PELVIC ORGANS: Probable uterine fibroids. No worrisome adnexal mass.    MUSCULOSKELETAL: Lower lumbar spinal fusion hardware. Scattered degenerative changes. No destructive osseous  lesions.      Impression    IMPRESSION:   No acute abnormality in the abdomen or pelvis.   XR Chest 2 Views     Status: None    Narrative    EXAM: XR CHEST 2 VIEWS  LOCATION: [facility]  DATE: 5/5/2024    INDICATION: pain  COMPARISON: None.      Impression    IMPRESSION: Negative chest.   CT Chest Pulmonary Embolism w Contrast     Status: None    Narrative    EXAM: CT CHEST PULMONARY EMBOLISM W CONTRAST  LOCATION: [facility]  DATE: 5/5/2024    INDICATION: elevated d dimer and pain.    COMPARISON: None.  TECHNIQUE: CT chest pulmonary angiogram during arterial phase injection of IV contrast. Multiplanar reformats and MIP reconstructions were performed. Dose reduction techniques were used.   CONTRAST: 54 mL Isovue-370    FINDINGS:  ANGIOGRAM CHEST: Pulmonary arteries are normal caliber and negative for pulmonary emboli. Thoracic aorta is negative for dissection. No CT evidence of right heart strain.    LUNGS AND PLEURA: Bibasilar atelectasis. Mild mosaic attenuation of the lungs bilaterally suggesting small airway disease. No focal consolidation or effusion.    MEDIASTINUM/AXILLAE: Heart is normal in size. No mediastinal, axillary, or hilar adenopathy.    CORONARY ARTERY CALCIFICATION: Moderate.    UPPER ABDOMEN: Small stones noted within the right kidney.    MUSCULOSKELETAL: Degenerative changes of the spine.      Impression    IMPRESSION:  1.  No pulmonary embolism.  2.  Mild mosaic attenuation of the lungs bilaterally suggesting small airway disease.     [place] Draw     Status: None    Narrative    The following orders were created for panel order [place] Draw.  Procedure                               Abnormality         Status                     ---------                               -----------         ------                     Extra Blue Top Tube[[id number]]                              Final result               Extra  Red Top Tube[[id number]]                               Final result               Extra Green Top (Lithium...[[id number]]                      Final result               Extra Purple Top Tube[[id number]]                            Final result                 Please view results for these tests on the individual orders.   Lactic acid whole blood     Status: Abnormal   Result Value Ref Range    Lactic Acid 2.4 (H) 0.7 - 2.0 mmol/L   Extra Blue Top Tube     Status: None   Result Value Ref Range    Hold Specimen JIC    Extra Red Top Tube     Status: None   Result Value Ref Range    Hold Specimen JIC    Extra Green Top (Lithium Heparin) Tube     Status: None   Result Value Ref Range    Hold Specimen JIC    Extra Purple Top Tube     Status: None   Result Value Ref Range    Hold Specimen JIC    Comprehensive metabolic panel     Status: Abnormal   Result Value Ref Range    Sodium 139 135 - 145 mmol/L    Potassium 4.1 3.4 - 5.3 mmol/L    Carbon Dioxide (CO2) 23 22 - 29 mmol/L    Anion Gap 14 7 - 15 mmol/L    Urea Nitrogen 19.1 6.0 - 20.0 mg/dL    Creatinine 0.76 0.51 - 0.95 mg/dL    GFR Estimate >90 >60 mL/min/1.73m2    Calcium 9.5 8.6 - 10.0 mg/dL    Chloride 102 98 - 107 mmol/L    Glucose 66 (L) 70 - 99 mg/dL    Alkaline Phosphatase 89 40 - 150 U/L    AST 23 0 - 45 U/L    ALT 17 0 - 50 U/L    Protein Total 7.8 6.4 - 8.3 g/dL    Albumin 4.5 3.5 - 5.2 g/dL    Bilirubin Total 0.3 <=1.2 mg/dL   UA with Microscopic reflex to Culture     Status: Abnormal    Specimen: Urine, Midstream   Result Value Ref Range    Color Urine Light Yellow Colorless, Straw, Light Yellow, Yellow    Appearance Urine Clear Clear    Glucose Urine Negative Negative mg/dL    Bilirubin Urine Negative Negative    Ketones Urine Negative Negative mg/dL    Specific Gravity Urine 1.025 1.003 - 1.035    Blood Urine Negative Negative    pH Urine 5.5 5.0 - 7.0    Protein Albumin Urine Negative Negative mg/dL    Urobilinogen Urine Normal Normal, 2.0 mg/dL    Nitrite  Urine Negative Negative    Leukocyte Esterase Urine Negative Negative    Mucus Urine Present (A) None Seen /LPF    RBC Urine 1 <=2 /HPF    WBC Urine 2 <=5 /HPF    Squamous Epithelials Urine 1 <=1 /HPF    Narrative    Urine Culture not indicated   Lipase     Status: Normal   Result Value Ref Range    Lipase 29 13 - 60 U/L   Troponin T, High Sensitivity     Status: Normal   Result Value Ref Range    Troponin T, High Sensitivity 11 <=14 ng/L   CBC with platelets and differential     Status: Abnormal   Result Value Ref Range    WBC Count 6.1 4.0 - 11.0 10e3/uL    RBC Count 4.14 3.80 - 5.20 10e6/uL    Hemoglobin 11.4 (L) 11.7 - 15.7 g/dL    Hematocrit 35.1 35.0 - 47.0 %    MCV 85 78 - 100 fL    MCH 27.5 26.5 - 33.0 pg    MCHC 32.5 31.5 - 36.5 g/dL    RDW 13.1 10.0 - 15.0 %    Platelet Count 231 150 - 450 10e3/uL    % Neutrophils 70 %    % Lymphocytes 20 %    % Monocytes 8 %    % Eosinophils 1 %    % Basophils 1 %    % Immature Granulocytes 0 %    NRBCs per 100 WBC 0 <1 /100    Absolute Neutrophils 4.3 1.6 - 8.3 10e3/uL    Absolute Lymphocytes 1.2 0.8 - 5.3 10e3/uL    Absolute Monocytes 0.5 0.0 - 1.3 10e3/uL    Absolute Eosinophils 0.1 0.0 - 0.7 10e3/uL    Absolute Basophils 0.0 0.0 - 0.2 10e3/uL    Absolute Immature Granulocytes 0.0 <=0.4 10e3/uL    Absolute NRBCs 0.0 10e3/uL   Lactic acid whole blood     Status: Abnormal   Result Value Ref Range    Lactic Acid 1.5 0.7 - 2.0 mmol/L    Glucose 45 (LL) 70 - 99 mg/dL   Glucose by meter     Status: Abnormal   Result Value Ref Range    GLUCOSE BY METER POCT 43 (LL) 70 - 99 mg/dL   D dimer quantitative     Status: Abnormal   Result Value Ref Range    D-Dimer Quantitative 0.57 (H) 0.00 - 0.50 ug/mL FEU    Narrative    This D-dimer assay is intended for use in conjunction with a clinical pretest probability assessment model to exclude pulmonary embolism (PE) and deep venous thrombosis (DVT) in outpatients suspected of PE or DVT. The cut-off value is 0.50 ug/mL FEU.    For  patients 50 years of age or older, the application of age-adjusted cut-off values for D-Dimer may increase the specificity without significant effect on sensitivity. The literature suggested calculation age adjusted cut-off in ug/L = age in years x 10 ug/L. The results in this laboratory are reported as ug/mL rather than ug/L. The calculation for age adjusted cut off in ug/mL= age in years x 0.01 ug/mL. For example, the cut off for a 76 year old male is 76 x 0.01 ug/mL = 0.76 ug/mL (760 ug/L).    M Sandi et al. Age adjusted D-dimer cut-off levels to rule out pulmonary embolism: The ADJUST-PE Study. CRISSY 2014;311:9582-4152.; HJ Rosa et al. Diagnostic accuracy of conventional or age adjusted D-dimer cutoff values in older patients with suspected venous thromboembolism. Systemic review and meta-analysis. BMJ 2013:346:f2492.   Glucose by meter     Status: Abnormal   Result Value Ref Range    GLUCOSE BY METER POCT 59 (L) 70 - 99 mg/dL   D dimer quantitative     Status: Normal   Result Value Ref Range    D-Dimer Quantitative 0.49 0.00 - 0.50 ug/mL FEU    Narrative    This D-dimer assay is intended for use in conjunction with a clinical pretest probability assessment model to exclude pulmonary embolism (PE) and deep venous thrombosis (DVT) in outpatients suspected of PE or DVT. The cut-off value is 0.50 ug/mL FEU.    For patients 50 years of age or older, the application of age-adjusted cut-off values for D-Dimer may increase the specificity without significant effect on sensitivity. The literature suggested calculation age adjusted cut-off in ug/L = age in years x 10 ug/L. The results in this laboratory are reported as ug/mL rather than ug/L. The calculation for age adjusted cut off in ug/mL= age in years x 0.01 ug/mL. For example, the cut off for a 76 year old male is 76 x 0.01 ug/mL = 0.76 ug/mL (760 ug/L).    SAL Junior et al. Age adjusted D-dimer cut-off levels to rule out pulmonary embolism: The ADJUST-PE Study.  CRISSY 2014;311:8323-3498.; HJ Rosa et al. Diagnostic accuracy of conventional or age adjusted D-dimer cutoff values in older patients with suspected venous thromboembolism. Systemic review and meta-analysis. BMJ 2013:346:f2492.   Glucose by meter     Status: Normal   Result Value Ref Range    GLUCOSE BY METER POCT 74 70 - 99 mg/dL   Troponin T, High Sensitivity     Status: Normal   Result Value Ref Range    Troponin T, High Sensitivity 8 <=14 ng/L   Glucose by meter     Status: Normal   Result Value Ref Range    GLUCOSE BY METER POCT 96 70 - 99 mg/dL   EKG 12-lead, tracing only     Status: None   Result Value Ref Range    Systolic Blood Pressure  mmHg    Diastolic Blood Pressure  mmHg    Ventricular Rate 88 BPM    Atrial Rate 88 BPM    NM Interval 122 ms    QRS Duration 84 ms     ms    QTc 447 ms    P Axis 56 degrees    R AXIS 34 degrees    T Axis 61 degrees    Interpretation ECG       Sinus rhythm with occasional Premature ventricular complexes  Otherwise normal ECG  Unconfirmed report - interpretation of this ECG is computer generated - see medical record for final interpretation  Confirmed by - EMERGENCY ROOM, PHYSICIAN ([id number]),  PARVEZ YOU ([id number]) on 5/5/2024 8:34:46 AM     CBC with platelets differential     Status: Abnormal    Narrative    The following orders were created for panel order CBC with platelets differential.  Procedure                               Abnormality         Status                     ---------                               -----------         ------                     CBC with platelets and d...[[id number]]  Abnormal            Final result                 Please view results for these tests on the individual orders.     Medications   acetaminophen (TYLENOL) tablet 975 mg (has no administration in time range)   lisinopril (ZESTRIL) tablet 5 mg (5 mg Oral $Given 5/5/24 2005)   metFORMIN (GLUCOPHAGE XR) 24 hr tablet 1,500 mg (1,500 mg Oral $Given 5/5/24 1830)    pantoprazole (PROTONIX) IV push injection 40 mg (40 mg Intravenous $Given 5/5/24 2006)   senna-docusate (SENOKOT-S/PERICOLACE) 8.6-50 MG per tablet 1 tablet (has no administration in time range)     Or   senna-docusate (SENOKOT-S/PERICOLACE) 8.6-50 MG per tablet 2 tablet (has no administration in time range)   ondansetron (ZOFRAN ODT) ODT tab 4 mg (has no administration in time range)     Or   ondansetron (ZOFRAN) injection 4 mg (has no administration in time range)   lidocaine 1 % 0.1-1 mL (has no administration in time range)   lidocaine (LMX4) cream (has no administration in time range)   sodium chloride (PF) 0.9% PF flush 3 mL (has no administration in time range)   sodium chloride (PF) 0.9% PF flush 3 mL ( Intracatheter Canceled Entry 5/5/24 1615)   glucose gel 15-30 g (has no administration in time range)     Or   dextrose 50 % injection 25-50 mL (has no administration in time range)     Or   glucagon injection 1 mg (has no administration in time range)   insulin aspart (NovoLOG) injection (RAPID ACTING) ( Subcutaneous Not Given 5/5/24 1906)   insulin aspart (NovoLOG) injection (RAPID ACTING) (has no administration in time range)   HYDROmorphone (PF) (DILAUDID) injection 0.3-0.5 mg (has no administration in time range)   oxyCODONE (ROXICODONE) tablet 5-10 mg (has no administration in time range)   naloxone (NARCAN) injection 0.2 mg (has no administration in time range)     Or   naloxone (NARCAN) injection 0.4 mg (has no administration in time range)     Or   naloxone (NARCAN) injection 0.2 mg (has no administration in time range)     Or   naloxone (NARCAN) injection 0.4 mg (has no administration in time range)   melatonin tablet 5 mg (has no administration in time range)   HYDROmorphone (DILAUDID) injection 0.2 mg (0.2 mg Intravenous $Given 5/5/24 0837)   HYDROmorphone (DILAUDID) injection 0.2 mg (0.2 mg Intravenous $Given 5/5/24 0906)   sodium chloride 0.9% BOLUS 1,000 mL (0 mLs Intravenous Stopped 5/5/24  1133)   iopamidol (ISOVUE-370) solution 100 mL (78 mLs Intravenous $Given 5/5/24 1038)   sodium chloride 0.9 % bag 100mL for CT scan flush use (60 mLs Intravenous $Given 5/5/24 1038)   HYDROmorphone (DILAUDID) injection 0.2 mg (0.2 mg Intravenous $Given 5/5/24 1042)   alum & mag hydroxide-simethicone (MAALOX) suspension 15 mL (15 mLs Oral $Given 5/5/24 1257)   sodium chloride 0.9% BOLUS 1,000 mL (0 mLs Intravenous Stopped 5/5/24 1650)   oxyCODONE (ROXICODONE) tablet 5 mg (5 mg Oral $Given 5/5/24 1259)   iopamidol (ISOVUE-370) solution 100 mL (54 mLs Intravenous $Given 5/5/24 1344)   sodium chloride 0.9 % bag 100mL for CT scan flush use (75 mLs Intravenous $Given 5/5/24 1345)     Labs Ordered and Resulted from Time of ED Arrival to Time of ED Departure   LACTIC ACID WHOLE BLOOD - Abnormal       Result Value    Lactic Acid 2.4 (*)    COMPREHENSIVE METABOLIC PANEL - Abnormal    Sodium 139      Potassium 4.1      Carbon Dioxide (CO2) 23      Anion Gap 14      Urea Nitrogen 19.1      Creatinine 0.76      GFR Estimate >90      Calcium 9.5      Chloride 102      Glucose 66 (*)     Alkaline Phosphatase 89      AST 23      ALT 17      Protein Total 7.8      Albumin 4.5      Bilirubin Total 0.3     ROUTINE UA WITH MICROSCOPIC REFLEX TO CULTURE - Abnormal    Color Urine Light Yellow      Appearance Urine Clear      Glucose Urine Negative      Bilirubin Urine Negative      Ketones Urine Negative      Specific Gravity Urine 1.025      Blood Urine Negative      pH Urine 5.5      Protein Albumin Urine Negative      Urobilinogen Urine Normal      Nitrite Urine Negative      Leukocyte Esterase Urine Negative      Mucus Urine Present (*)     RBC Urine 1      WBC Urine 2      Squamous Epithelials Urine 1     CBC WITH PLATELETS AND DIFFERENTIAL - Abnormal    WBC Count 6.1      RBC Count 4.14      Hemoglobin 11.4 (*)     Hematocrit 35.1      MCV 85      MCH 27.5      MCHC 32.5      RDW 13.1      Platelet Count 231      % Neutrophils 70       % Lymphocytes 20      % Monocytes 8      % Eosinophils 1      % Basophils 1      % Immature Granulocytes 0      NRBCs per 100 WBC 0      Absolute Neutrophils 4.3      Absolute Lymphocytes 1.2      Absolute Monocytes 0.5      Absolute Eosinophils 0.1      Absolute Basophils 0.0      Absolute Immature Granulocytes 0.0      Absolute NRBCs 0.0     LACTIC ACID WHOLE BLOOD - Abnormal    Lactic Acid 1.5      Glucose 45 (*)    GLUCOSE BY METER - Abnormal    GLUCOSE BY METER POCT 43 (*)    D DIMER QUANTITATIVE - Abnormal    D-Dimer Quantitative 0.57 (*)    GLUCOSE BY METER - Abnormal    GLUCOSE BY METER POCT 59 (*)    LIPASE - Normal    Lipase 29     TROPONIN T, HIGH SENSITIVITY - Normal    Troponin T, High Sensitivity 11     D DIMER QUANTITATIVE - Normal    D-Dimer Quantitative 0.49     GLUCOSE BY METER - Normal    GLUCOSE BY METER POCT 74     TROPONIN T, HIGH SENSITIVITY - Normal    Troponin T, High Sensitivity 8     GLUCOSE BY METER - Normal    GLUCOSE BY METER POCT 96     GLUCOSE MONITOR NURSING POCT   GLUCOSE MONITOR NURSING POCT     CT Chest Pulmonary Embolism w Contrast   Final Result   IMPRESSION:   1.  No pulmonary embolism.   2.  Mild mosaic attenuation of the lungs bilaterally suggesting small airway disease.         XR Chest 2 Views   Final Result   IMPRESSION: Negative chest.      CT Abdomen Pelvis w Contrast   Final Result   IMPRESSION:    No acute abnormality in the abdomen or pelvis.             Critical care was not performed.     Medical Decision Making  The patient's presentation was of moderate complexity (an undiagnosed new problem with uncertain diagnosis).    The patient's evaluation involved:  review of external note(s) from 1 sources (prior diagnosis and meds)  ordering and/or review of 3+ test(s) in this encounter (see separate area of note for details)  discussion of management or test interpretation with another health professional (triage MD)    The patient's management necessitated high risk  (a decision regarding hospitalization).    Assessment & Plan    Sandy Kline is a 54 year old female with hx of chronic gastritis, DM2, gastroparesis, GERD, hypertension, who presents to the ED with sudden onset epigastric that started at 11 am yesterday radiating into her left shoulder.  Pain seems to begin in abdomen.  No rebound or guarding.   Lower abdomen normal.  Diff dx: ureteral stone, gastritis, kidney stone, perforated ulcer. ACS.  Iv placed, labs and dilaudid ordered.  Epic shows hx of gastritis so protonix 40 mg IV ordered as well. ECG unchanged and normal. Will do CT but want labs back prior to determine which Ct. 2nd dose of dilaudid given.  Lactic acid was 2.4 so NS 1 L IV ordered. This normalized with fluids. Troponin was normal and ecg normal so unlikely ACS.  Remaining labs show no acute concerns.  CT abd/pelvis returns without cause for her pain.  She appears uncomfortable and requires repeated doses of dilaudid.  D dimer was checked and elevated and so I proceed with chest ct for PE which showed also no cause for her symptoms.  She keeps talking about the burning of her skin luq wrapping around into the left flank. I wonder if she is having the start of a shingles episode. There is no rash.  I gave her oxycodone 5 mg po and gi cocktail wondering if this could be gastritis.  I also gave her protonix IV.  She feels no better.  She continues to reposition and breath deeply and even lays perpendicular to the bed face down trying to find some comfort.  Given this I feel like she cannot go home and needs observation admission for pain control.  I discussed this case with the triage MD who agrees.     I have reviewed the nursing notes. I have reviewed the findings, diagnosis, plan and need for follow up with the patient.    Current Discharge Medication List          Final diagnoses:   Left flank pain - unclear cause       Marlene Kennedy MD  [facility] EMERGENCY DEPARTMENT  5/5/2024     Brent  MD Marlene  05/05/24 [id number]       Marlene Kennedy MD  05/05/24 [id number]

## 2024-05-06 ENCOUNTER — APPOINTMENT (OUTPATIENT)
Dept: MRI IMAGING | Facility: CLINIC | Age: 54
End: 2024-05-06
Attending: INTERNAL MEDICINE
Payer: COMMERCIAL

## 2024-05-06 LAB
CRP SERPL-MCNC: <3 MG/L
GLUCOSE BLDC GLUCOMTR-MCNC: 104 MG/DL (ref 70–99)
GLUCOSE BLDC GLUCOMTR-MCNC: 114 MG/DL (ref 70–99)
GLUCOSE BLDC GLUCOMTR-MCNC: 115 MG/DL (ref 70–99)
GLUCOSE BLDC GLUCOMTR-MCNC: 143 MG/DL (ref 70–99)
GLUCOSE BLDC GLUCOMTR-MCNC: 224 MG/DL (ref 70–99)
GLUCOSE BLDC GLUCOMTR-MCNC: 65 MG/DL (ref 70–99)
GLUCOSE BLDC GLUCOMTR-MCNC: 78 MG/DL (ref 70–99)
GLUCOSE BLDC GLUCOMTR-MCNC: 95 MG/DL (ref 70–99)

## 2024-05-06 PROCEDURE — 250N000011 HC RX IP 250 OP 636: Performed by: PHYSICIAN ASSISTANT

## 2024-05-06 PROCEDURE — 96376 TX/PRO/DX INJ SAME DRUG ADON: CPT

## 2024-05-06 PROCEDURE — 250N000013 HC RX MED GY IP 250 OP 250 PS 637: Performed by: INTERNAL MEDICINE

## 2024-05-06 PROCEDURE — 250N000012 HC RX MED GY IP 250 OP 636 PS 637: Performed by: INTERNAL MEDICINE

## 2024-05-06 PROCEDURE — 250N000012 HC RX MED GY IP 250 OP 636 PS 637: Performed by: PHYSICIAN ASSISTANT

## 2024-05-06 PROCEDURE — G0378 HOSPITAL OBSERVATION PER HR: HCPCS

## 2024-05-06 PROCEDURE — C9113 INJ PANTOPRAZOLE SODIUM, VIA: HCPCS | Performed by: PHYSICIAN ASSISTANT

## 2024-05-06 PROCEDURE — 99233 SBSQ HOSP IP/OBS HIGH 50: CPT | Performed by: INTERNAL MEDICINE

## 2024-05-06 PROCEDURE — 72146 MRI CHEST SPINE W/O DYE: CPT

## 2024-05-06 PROCEDURE — 96375 TX/PRO/DX INJ NEW DRUG ADDON: CPT

## 2024-05-06 PROCEDURE — 258N000001 HC RX 258: Performed by: PHYSICIAN ASSISTANT

## 2024-05-06 PROCEDURE — 72146 MRI CHEST SPINE W/O DYE: CPT | Mod: 26 | Performed by: RADIOLOGY

## 2024-05-06 PROCEDURE — 250N000013 HC RX MED GY IP 250 OP 250 PS 637: Performed by: PHYSICIAN ASSISTANT

## 2024-05-06 PROCEDURE — 82962 GLUCOSE BLOOD TEST: CPT

## 2024-05-06 RX ORDER — ATORVASTATIN CALCIUM 20 MG/1
20 TABLET, FILM COATED ORAL DAILY
COMMUNITY

## 2024-05-06 RX ORDER — CAPSAICIN 0.75 MG/G
CREAM TOPICAL 3 TIMES DAILY
Status: DISCONTINUED | OUTPATIENT
Start: 2024-05-06 | End: 2024-05-07 | Stop reason: HOSPADM

## 2024-05-06 RX ORDER — ATORVASTATIN CALCIUM 20 MG/1
20 TABLET, FILM COATED ORAL DAILY
Status: DISCONTINUED | OUTPATIENT
Start: 2024-05-06 | End: 2024-05-07 | Stop reason: HOSPADM

## 2024-05-06 RX ORDER — DIPHENHYDRAMINE HCL 25 MG
25 CAPSULE ORAL EVERY 6 HOURS PRN
Status: DISCONTINUED | OUTPATIENT
Start: 2024-05-06 | End: 2024-05-07 | Stop reason: HOSPADM

## 2024-05-06 RX ORDER — PANTOPRAZOLE SODIUM 40 MG/1
40 TABLET, DELAYED RELEASE ORAL
Status: DISCONTINUED | OUTPATIENT
Start: 2024-05-06 | End: 2024-05-07 | Stop reason: HOSPADM

## 2024-05-06 RX ORDER — POLYETHYLENE GLYCOL 3350 17 G/17G
17 POWDER, FOR SOLUTION ORAL DAILY
Status: DISCONTINUED | OUTPATIENT
Start: 2024-05-06 | End: 2024-05-07 | Stop reason: HOSPADM

## 2024-05-06 RX ORDER — GABAPENTIN 300 MG/1
300 CAPSULE ORAL DAILY
Status: DISCONTINUED | OUTPATIENT
Start: 2024-05-06 | End: 2024-05-07 | Stop reason: HOSPADM

## 2024-05-06 RX ORDER — LIDOCAINE 4 G/G
2-3 PATCH TOPICAL
Status: DISCONTINUED | OUTPATIENT
Start: 2024-05-06 | End: 2024-05-07 | Stop reason: HOSPADM

## 2024-05-06 RX ADMIN — DEXTROSE MONOHYDRATE 25 ML: 25 INJECTION, SOLUTION INTRAVENOUS at 02:40

## 2024-05-06 RX ADMIN — OXYCODONE HYDROCHLORIDE 10 MG: 5 TABLET ORAL at 06:21

## 2024-05-06 RX ADMIN — ATORVASTATIN CALCIUM 20 MG: 20 TABLET, FILM COATED ORAL at 18:14

## 2024-05-06 RX ADMIN — METFORMIN HYDROCHLORIDE 1500 MG: 500 TABLET, EXTENDED RELEASE ORAL at 16:45

## 2024-05-06 RX ADMIN — INSULIN ASPART 1 UNITS: 100 INJECTION, SOLUTION INTRAVENOUS; SUBCUTANEOUS at 14:32

## 2024-05-06 RX ADMIN — INSULIN DETEMIR 20 UNITS: 100 INJECTION, SOLUTION SUBCUTANEOUS at 23:39

## 2024-05-06 RX ADMIN — LIDOCAINE 3 PATCH: 4 PATCH TOPICAL at 13:37

## 2024-05-06 RX ADMIN — OXYCODONE HYDROCHLORIDE 10 MG: 5 TABLET ORAL at 20:06

## 2024-05-06 RX ADMIN — HYDROMORPHONE HYDROCHLORIDE 0.5 MG: 1 INJECTION, SOLUTION INTRAMUSCULAR; INTRAVENOUS; SUBCUTANEOUS at 09:34

## 2024-05-06 RX ADMIN — CAPSAICIN: 0.75 CREAM TOPICAL at 13:59

## 2024-05-06 RX ADMIN — PANTOPRAZOLE SODIUM 40 MG: 40 INJECTION, POWDER, FOR SOLUTION INTRAVENOUS at 07:39

## 2024-05-06 RX ADMIN — Medication 5 MG: at 22:19

## 2024-05-06 RX ADMIN — DIPHENHYDRAMINE HYDROCHLORIDE 25 MG: 25 CAPSULE ORAL at 18:14

## 2024-05-06 RX ADMIN — HYDROMORPHONE HYDROCHLORIDE 0.5 MG: 1 INJECTION, SOLUTION INTRAMUSCULAR; INTRAVENOUS; SUBCUTANEOUS at 02:40

## 2024-05-06 RX ADMIN — ONDANSETRON 4 MG: 2 INJECTION INTRAMUSCULAR; INTRAVENOUS at 12:09

## 2024-05-06 RX ADMIN — ACETAMINOPHEN 975 MG: 325 TABLET, FILM COATED ORAL at 07:49

## 2024-05-06 RX ADMIN — GABAPENTIN 300 MG: 300 CAPSULE ORAL at 13:37

## 2024-05-06 RX ADMIN — OXYCODONE HYDROCHLORIDE 10 MG: 5 TABLET ORAL at 13:37

## 2024-05-06 RX ADMIN — CAPSAICIN: 0.75 CREAM TOPICAL at 20:06

## 2024-05-06 RX ADMIN — LISINOPRIL 5 MG: 2.5 TABLET ORAL at 20:06

## 2024-05-06 RX ADMIN — POLYETHYLENE GLYCOL 3350 17 G: 17 POWDER, FOR SOLUTION ORAL at 18:15

## 2024-05-06 RX ADMIN — PANTOPRAZOLE SODIUM 40 MG: 40 TABLET, DELAYED RELEASE ORAL at 18:14

## 2024-05-06 RX ADMIN — ONDANSETRON 4 MG: 2 INJECTION INTRAMUSCULAR; INTRAVENOUS at 02:41

## 2024-05-06 ASSESSMENT — ACTIVITIES OF DAILY LIVING (ADL)
ADLS_ACUITY_SCORE: 23

## 2024-05-06 NOTE — PLAN OF CARE
Pt arrived to unit from  ED around 2100 via cart.  Plan of Care Reviewed With: patient  Overall Patient Progress: improving  Outcome Evaluation: C/o LUQ pain radiating to L flank and under L breast. PRN pain meds adjusted, heat packs utilized. Up SBA, reports intermittent dizziness with activity. 2 RN skin check done. Hypoglycemic O/N, emesis x2 after a few sips of juice. IV Zofran and D50 given. BG recheck 224. LBM 5/4. PIV SL. Contact and Airborne precaut maintained.  Needs assistance with ordering meals. Call light within reach, pt able to make needs known.     For vital signs and complete assessments, please see documentation flowsheets.

## 2024-05-06 NOTE — PLAN OF CARE
"Goal Outcome Evaluation:      Plan of Care Reviewed With: patient    Overall Patient Progress: improvingOverall Patient Progress: improving         VS: BP 96/56   Pulse 81   Temp 97.7  F (36.5  C)   Resp 16   Ht 1.575 m (5' 2\")   Wt 74.4 kg (164 lb 0.4 oz)   LMP 03/10/2016   SpO2 96%   BMI 30.00 kg/m       O2: SpO2 > 90% on RA. Denies SOB/chest pain.    Output: Voids adequately and spontaneously into toilet.    Last BM: 5/4    Activity: SBA   Skin: Redness to sacrum, pt reports burning sensation under skin    Pain: Managed with PRNs and scheduled. T-pump on this AM, stopped once lidocaine patches applied. Pt reported itching, but resolved with lotion.    CMS: A&Ox4. Baseline numbness to L foot.    Dressing: None   Diet: Regular - needs help ordering. Can speak english but has trouble with reading.    LDA: L AC PIV SL   Equipment: IV pole, personal belongings    Plan: Pending pain control    Additional Info: See note from infection prevention on isolation status   Order placed for MRI          "

## 2024-05-06 NOTE — PROGRESS NOTES
[facility]    Medicine Progress Note - Hospitalist Service, GOLD TEAM 18    Date of Admission:  5/5/2024    Assessment & Plan     Ms. Sandy Kline is a 55 yo female with hx of HTN, IDDM, GERD, and chronic gastritis admitted to [facility] under Obs status after presenting to ED with one day hx of acute severe LUQ abd pain that radiates into L back.     # Acute LUQ abd pain, L back pain.     Follows thoracic dermatome, does not cross midline. Nausea+ 1 episode of non bloody emesis. Afebrile. No cough or cp or sob.  Pain sharp to burning w/paresthesias, ttp even on superficial touch. No rash. H.o chicken pox as a child. No recent shingles lesions. H.o low back surgery. L5/S1.   Pt had chicken pox as child. CT chest, abdomen: no PE. No acute abnormality in abdomen or pelvis. DJD spine.   DJD spinal stenosis vs herpetic neuralgia vs others .    - Will get MRI thoracic spine  - Pain mx: Uncontrolled- add capsaicin topical. Lidocaine patch panel. Gabapentin 300 mg daily, ct tylenol, iv/po narcotics prn.   - Pain Mx consultation.   - Continue to monitor areas closely for the development of rash  - follow-up with infection prevention regarding isolation guidelines. Tr RN.       # GERD  # Chronic gastritis  - Continue PTA BID PPI, switch to po.     # T2DM, on insulin   # Hypoglycemia  PTA on Levemir insulin 40 units qpm, metformin XR 1500 mg qpm and semaglutide. Most recent A1C 6.1% this past Jan. Initial BG here 46 but pt states has being eating and drinking as usual despite above pain.   - Holding PTA Levemir for now- resume at 20 U Qpm.   - Continue PTA metformin  - hold PTA semaglutide   - medium Novolog sliding scale  - Accuchecks TID before meals and at bedtime   - Hypoglycemic protocol    # HTN: BPs stable.    - Continue PTA lisinopril 5 mg daily with parameters to hold.        Observation Goals: -diagnostic tests and consults completed and resulted, -vital signs normal or at  "patient baseline, -adequate pain control on oral analgesics, Nurse to notify provider when observation goals have been met and patient is ready for discharge.  Diet: Regular Diet Adult    DVT Prophylaxis: Pneumatic Compression Devices  Hoyt Catheter: Not present  Lines: None     Cardiac Monitoring: None  Code Status: Full Code      Clinically Significant Risk Factors Present on Admission                # Drug Induced Platelet Defect: home medication list includes an antiplatelet medication   # Hypertension: Home medication list includes antihypertensive(s)      # Obesity: Estimated body mass index is 30 kg/m  as calculated from the following:    Height as of this encounter: 1.575 m (5' 2\").    Weight as of this encounter: 74.4 kg (164 lb 0.4 oz).              Disposition Plan     Medically Ready for Discharge: Anticipated Tomorrow             Markus Nance MD  Hospitalist Service, [facility] 18  [facility]  Securely message with [employer] (more info)  Text page via [facility] Paging/Directory   See signed in provider for up to date coverage information  ______________________________________________________________________    Interval History   Interval events reviewed.   HPI reviewed.   Continue to have severe burning pain L upper abdomen and L mid back.   Nausea.   Poor po intake.   Denies fever or chills.   No cough or cp or sob.   No new sensory or motor complaint.   No new rash.    No other new or acute medical concern     Physical Exam   Vital Signs: Temp: 98.1  F (36.7  C) Temp src: Oral BP: 104/66 Pulse: 82   Resp: 16 SpO2: 90 % O2 Device: None (Room air)    Weight: 164 lbs .36 oz    General Appearance: Awake, interactive, NAD  HEENT: AT/NC, Anicteric, Moist MM  Neck: Supple.   Respiratory: Normal work of breathing. CTA BL.   Cardiovascular: S1 S2 Regular.  GI/Abd: Soft. ND. Ttp to superficial touch to LUQ that goes to back-- ends at midline. Along t 4-7 dermatome. "   Extremities: Distally wwp. No pedal edema.  Neuro: AO x 4, Grossly non focal.   Skin: No acute rash on exposed areas.   Psychiatry: Stable mood.     Medical Decision Making       55 MINUTES SPENT BY ME on the date of service doing chart review, history, exam, documentation & further activities per the note.      Data     I have personally reviewed the following data over the past 24 hrs:    Procal: N/A CRP: N/A Lactic Acid: N/A         Imaging results reviewed over the past 24 hrs:   No results found for this or any previous visit (from the past 24 hour(s)).  Recent Labs   Lab 05/06/24  1624 05/06/24  1407 05/06/24  1207 05/05/24  1128 05/05/24  0828   WBC  --   --   --   --  6.1   HGB  --   --   --   --  11.4*   MCV  --   --   --   --  85   PLT  --   --   --   --  231   NA  --   --   --   --  139   POTASSIUM  --   --   --   --  4.1   CHLORIDE  --   --   --   --  102   CO2  --   --   --   --  23   BUN  --   --   --   --  19.1   CR  --   --   --   --  0.76   ANIONGAP  --   --   --   --  14   DONOVAN  --   --   --   --  9.5   * 143* 104*   < > 66*   ALBUMIN  --   --   --   --  4.5   PROTTOTAL  --   --   --   --  7.8   BILITOTAL  --   --   --   --  0.3   ALKPHOS  --   --   --   --  89   ALT  --   --   --   --  17   AST  --   --   --   --  23   LIPASE  --   --   --   --  29    < > = values in this interval not displayed.

## 2024-05-07 VITALS
WEIGHT: 164.02 LBS | OXYGEN SATURATION: 96 % | BODY MASS INDEX: 30.18 KG/M2 | DIASTOLIC BLOOD PRESSURE: 61 MMHG | HEIGHT: 62 IN | RESPIRATION RATE: 16 BRPM | HEART RATE: 96 BPM | SYSTOLIC BLOOD PRESSURE: 107 MMHG | TEMPERATURE: 98.5 F

## 2024-05-07 LAB
GLUCOSE BLDC GLUCOMTR-MCNC: 102 MG/DL (ref 70–99)
GLUCOSE BLDC GLUCOMTR-MCNC: 117 MG/DL (ref 70–99)
GLUCOSE BLDC GLUCOMTR-MCNC: 127 MG/DL (ref 70–99)
GLUCOSE BLDC GLUCOMTR-MCNC: 148 MG/DL (ref 70–99)

## 2024-05-07 PROCEDURE — 250N000013 HC RX MED GY IP 250 OP 250 PS 637: Performed by: PHYSICIAN ASSISTANT

## 2024-05-07 PROCEDURE — 99239 HOSP IP/OBS DSCHRG MGMT >30: CPT | Performed by: INTERNAL MEDICINE

## 2024-05-07 PROCEDURE — G0378 HOSPITAL OBSERVATION PER HR: HCPCS

## 2024-05-07 PROCEDURE — 99222 1ST HOSP IP/OBS MODERATE 55: CPT | Performed by: PHYSICIAN ASSISTANT

## 2024-05-07 PROCEDURE — 250N000013 HC RX MED GY IP 250 OP 250 PS 637: Performed by: INTERNAL MEDICINE

## 2024-05-07 PROCEDURE — 250N000011 HC RX IP 250 OP 636: Performed by: PHYSICIAN ASSISTANT

## 2024-05-07 PROCEDURE — 82962 GLUCOSE BLOOD TEST: CPT

## 2024-05-07 PROCEDURE — 250N000011 HC RX IP 250 OP 636: Performed by: INTERNAL MEDICINE

## 2024-05-07 PROCEDURE — 96376 TX/PRO/DX INJ SAME DRUG ADON: CPT

## 2024-05-07 PROCEDURE — 96375 TX/PRO/DX INJ NEW DRUG ADDON: CPT

## 2024-05-07 RX ORDER — OXYCODONE HYDROCHLORIDE 5 MG/1
5-10 TABLET ORAL EVERY 4 HOURS PRN
Qty: 25 TABLET | Refills: 0 | Status: SHIPPED | OUTPATIENT
Start: 2024-05-07

## 2024-05-07 RX ORDER — PROCHLORPERAZINE MALEATE 5 MG
10 TABLET ORAL EVERY 6 HOURS PRN
Status: DISCONTINUED | OUTPATIENT
Start: 2024-05-07 | End: 2024-05-07 | Stop reason: HOSPADM

## 2024-05-07 RX ORDER — LIDOCAINE 4 G/G
2 PATCH TOPICAL EVERY 24 HOURS
Qty: 20 PATCH | Refills: 0 | Status: SHIPPED | OUTPATIENT
Start: 2024-05-07

## 2024-05-07 RX ORDER — GABAPENTIN 300 MG/1
300 CAPSULE ORAL 2 TIMES DAILY
Qty: 30 CAPSULE | Refills: 0 | Status: SHIPPED | OUTPATIENT
Start: 2024-05-07

## 2024-05-07 RX ORDER — ONDANSETRON 4 MG/1
4 TABLET, ORALLY DISINTEGRATING ORAL EVERY 6 HOURS PRN
Qty: 30 TABLET | Refills: 0 | Status: SHIPPED | OUTPATIENT
Start: 2024-05-07

## 2024-05-07 RX ADMIN — ATORVASTATIN CALCIUM 20 MG: 20 TABLET, FILM COATED ORAL at 09:49

## 2024-05-07 RX ADMIN — SENNOSIDES AND DOCUSATE SODIUM 2 TABLET: 50; 8.6 TABLET ORAL at 00:31

## 2024-05-07 RX ADMIN — ONDANSETRON 4 MG: 2 INJECTION INTRAMUSCULAR; INTRAVENOUS at 07:37

## 2024-05-07 RX ADMIN — PANTOPRAZOLE SODIUM 40 MG: 40 TABLET, DELAYED RELEASE ORAL at 09:49

## 2024-05-07 RX ADMIN — GABAPENTIN 300 MG: 300 CAPSULE ORAL at 09:49

## 2024-05-07 RX ADMIN — CAPSAICIN: 0.75 CREAM TOPICAL at 09:49

## 2024-05-07 RX ADMIN — PROCHLORPERAZINE EDISYLATE 10 MG: 5 INJECTION INTRAMUSCULAR; INTRAVENOUS at 08:21

## 2024-05-07 RX ADMIN — LIDOCAINE 3 PATCH: 4 PATCH TOPICAL at 07:39

## 2024-05-07 RX ADMIN — POLYETHYLENE GLYCOL 3350 17 G: 17 POWDER, FOR SOLUTION ORAL at 09:51

## 2024-05-07 RX ADMIN — DIPHENHYDRAMINE HYDROCHLORIDE 25 MG: 25 CAPSULE ORAL at 00:26

## 2024-05-07 RX ADMIN — OXYCODONE HYDROCHLORIDE 10 MG: 5 TABLET ORAL at 09:49

## 2024-05-07 RX ADMIN — OXYCODONE HYDROCHLORIDE 10 MG: 5 TABLET ORAL at 00:26

## 2024-05-07 ASSESSMENT — ACTIVITIES OF DAILY LIVING (ADL)
ADLS_ACUITY_SCORE: 23

## 2024-05-07 NOTE — PLAN OF CARE
Goal Outcome Evaluation:      Plan of Care Reviewed With: patient    Overall Patient Progress: improvingOverall Patient Progress: improving    VS: VSS   O2: SpO2>90% on room air, denies SOB and CP   Output: Voiding without difficulty in bathroom   Last BM: 05/04/2024   Activity: Up with SBA   Skin: Redness to sacrum, pt reports generalized itching, prn benadryl given with relief.    Pain: Managed with prn oxy   CMS: A&O x4, baseline numbness to L foot   Dressing: none   Diet: Regular. BG with meals    LDA: L PIV SL   Equipment: IV pole, personal belongings   Plan: TBD. Cont with POC   Additional Info:

## 2024-05-07 NOTE — PLAN OF CARE
Goal Outcome Evaluation:      Plan of Care Reviewed With: patient    Overall Patient Progress: improvingOverall Patient Progress: improving    Outcome Evaluation: Pt is A&O x4, able to make needs known. reported some nausea around 1600 and reported some vomiting earlier in day. pt went down for MRI at approximately 1700, has denies nausea and vomiting since return from MRI. LUQ pain radiating to L flank and under L breast, managed with lidocaine patches, prn oxy, capsaicin cream. pt reports intermittent dizziness with activity, up with SBA. blood glucose checks and sliding scale. contact and airborne precautions maintained      VS: Temp: 98.1  F (36.7  C) Temp src: Oral BP: 115/68 Pulse: 82   Resp: 16 SpO2: 90 % O2 Device: None (Room air)       O2: SpO2>90% on room air, denies SOB and CP   Output: Voiding without difficulty in bathroom   Last BM: 05/04/2024   Activity: Up with SBA   Skin: Redness to sacrum, pt reports generalized itching, pt given prn benadryl and reported relief.    Pain: Managed with prn oxy, lidocaine patches, capsaicin cream   CMS: A&O x4, baseline numbness to L foot   Dressing: none   Diet: Regular, poor appetite and nausea/vomiting reported at start of shift. Per pt report nausea is intermittent and resolves with prn zofran, did not request this sp   LDA: L PIV SL   Equipment: IV pole, personal belongings   Plan: TBD   Additional Info: Pt had MRI today, results in chart

## 2024-05-07 NOTE — DISCHARGE SUMMARY
"[facility]  Hospitalist Discharge Summary      Date of Admission:  5/5/2024  Date of Discharge:  5/7/2024  Discharging Provider: Juan R Colbert MD  Discharge Service: Hospitalist Service, GOLD TEAM 19    Discharge Diagnoses   Acute LUQ abd pain, left flank, thoracic back pain     GERD  Chronic gastritis  Chronic Nausea, intermittent emesis  T2DM, on insulin   Transient Hypoglycemia  HTN    Clinically Significant Risk Factors     # Obesity: Estimated body mass index is 30 kg/m  as calculated from the following:    Height as of this encounter: 1.575 m (5' 2\").    Weight as of this encounter: 74.4 kg (164 lb 0.4 oz).       Follow-ups Needed After Discharge   Follow-up Appointments     Adult [facility] Follow-up and recommended labs and tests      Follow up with primary care provider, Berto Hester, within 3-5 days   for hospital follow- up.  No follow up labs or test are needed.      Appointments on [facility] and/or [facility] (with [facility] or [facility]   provider or service). Call [phone] if you haven't heard regarding   these appointments within 7 days of discharge.              Discharge Disposition   Discharged to home  Condition at discharge: Stable    Hospital Course   Ms. Sandy Kline is a 55 yo female with hx of HTN, DM type 2, GERD, and chronic gastritis admitted to [facility] under Obs status after presenting to ED with one day hx of acute severe LUQ abd pain that radiated around left flank, thoracic back.     # Acute LUQ abd pain, left flank, thoracic back pain  Presenting with burning discomfort over left upper quadrant radiating around left upper flank, thoracic dermatomal distribution.  Also complained of nausea with episodes of nonbloody emesis, however patient states this is chronic and intermittent which she attributes to Ozempic for diabetes.  Remained afebrile, normal WBC, no cough, chest pain.  UA normal.  CT abdomen and pelvis negative for acute " "findings.  Chest CT negative for pulmonary embolism or acute findings.  CMP, lipase, CRP, troponin negative.  Thoracic spine MRI unremarkable.  Treated with topical Lidoderm, capsaicin cream and oxycodone.  Initially suspected herpetic neuropathy, however over the course of her brief hospitalization did not evolve a rash or any vesicles.  Etiology remains unclear, SPECT possible musculoskeletal strain from chronic, intermittent nausea and vomiting which patient attributes to Ozempic.  Pain was significantly improved on the day of discharge with pain team consulted.  Patient desiring to go home, tolerating p.o. well despite intermittent nausea and occasional emesis which she states \"I have all the time\".  -Continue Lidoderm to the left abdomen and flank daily  -Discontinue capsaicin cream  -Continue scheduled gabapentin 300 mg twice daily, was taking as needed at home.  May also benefit chronic knee pain.  -Continue oxycodone 5-10 mg 4 times daily as needed  -Follow-up with PCP within the next 7 days        # GERD  # Chronic gastritis  # Chronic Nausea, intermittent emesis  Patient reports chronic nausea with intermittent emesis which she attributes to Ozempic.  States her PCP had decreased the dose in half, but has never had a trial off of it by her report.  Did have some intermittent emesis in the hospital, nonbloody.  Abdominal exam remains benign and CMP, lipase normal.  -Continue PTA PPI  -Would consider trial off of Ozempic.  Discussed with patient and will discuss with PCP at follow-up.     # T2DM, on insulin   # Hypoglycemia  PTA on Levemir insulin 40 units qpm, metformin XR 1500 mg qpm and semaglutide. Most recent A1C 6.1% this past Jan. Initial BG here 46 but pt stated has being eating and drinking as usual despite above pain.  Blood sugars subsequently stable and well-controlled in the hospital.  -Resume PTA Levemir  - Continue PTA metformin  - hold PTA semaglutide, would recommend discontinuing to see if " chronic nausea and intermittent emesis resolved.  Discussed with patient and will defer to PCP at follow-up     # HTN: BPs stable.    - Continue PTA lisinopril 5 mg daily    Consultations This Hospital Stay   PAIN MANAGEMENT ADULT IP CONSULT    Code Status   Full Code    Time Spent on this Encounter   I, Juan R Colbetr MD, personally saw the patient today and spent greater than 30 minutes discharging this patient.       Juan R Colbert MD  [facility] MED SURG ORTHOPEDIC  [facility] [facility] [address]  Phone: [phone]  Fax: [phone]  ______________________________________________________________________    Physical Exam   Vital Signs: Temp: 98.5  F (36.9  C) Temp src: Oral BP: 107/61 Pulse: 96   Resp: 16 SpO2: 96 % O2 Device: None (Room air)    Weight: 164 lbs .36 oz  General: Alert and oriented x 4, very pleasant.  Speech, affect normal.  Chest: CTA bilaterally  CV: RRR.  No murmurs.  Abdomen: Obese.  Normal active bowel sounds.  Soft, nondistended.  Mild epigastric tenderness but no rebound or guarding.  Extremities: Warm.  No edema.  Skin: Left abdominal and flank Lidoderm patches in place, peeled back, no underlying rash or vesicular lesions.  Neuro: Nonfocal.         Primary Care Physician   Berto Hester    Discharge Orders      Reason for your hospital stay    New onset left abdominal and flank pain, unclear etiology.     Activity    Your activity upon discharge: activity as tolerated     Adult [facility] Follow-up and recommended labs and tests    Follow up with primary care provider, Berto Hester, within 3-5 days for hospital follow- up.  No follow up labs or test are needed.      Appointments on [place] and/or [facility] (with [facility] or [facility] provider or service). Call [phone] if you haven't heard regarding these appointments within 7 days of discharge.     Full Code     Diet    Follow this diet upon discharge:       Regular, diabetic  Diet Adult       Significant Results and Procedures   Most Recent 3 CBC's:  Recent Labs   Lab Test 05/05/24  0828 01/14/24 2013 12/31/22  0004   WBC 6.1 6.1 6.0   HGB 11.4* 11.8 11.6*   MCV 85 80 86    253 208     Most Recent 3 BMP's:  Recent Labs   Lab Test 05/07/24  0948 05/07/24  0853 05/07/24  0156 05/05/24  1128 05/05/24 0828 01/14/24 2013 01/14/24 2013 12/31/22  0004   NA  --   --   --   --  139  --  138 140   POTASSIUM  --   --   --   --  4.1  --  4.3 3.9   CHLORIDE  --   --   --   --  102  --  100 108   CO2  --   --   --   --  23  --  25 28   BUN  --   --   --   --  19.1  --  18.9 17   CR  --   --   --   --  0.76  --  0.97* 0.81   ANIONGAP  --   --   --   --  14  --  13 4   DONOVAN  --   --   --   --  9.5  --  10.3* 9.3   * 148* 117*   < > 66*   < > 133* 98    < > = values in this interval not displayed.     Most Recent 2 LFT's:  Recent Labs   Lab Test 05/05/24 0828 01/14/24 2013   AST 23 32   ALT 17 24   ALKPHOS 89 76   BILITOTAL 0.3 0.3     Most Recent TSH and T4:No lab results found.  Most Recent Hemoglobin A1c:No lab results found.  Most Recent Urinalysis:  Recent Labs   Lab Test 05/05/24  0855   COLOR Light Yellow   APPEARANCE Clear   URINEGLC Negative   URINEBILI Negative   URINEKETONE Negative   SG 1.025   UBLD Negative   URINEPH 5.5   PROTEIN Negative   NITRITE Negative   LEUKEST Negative   RBCU 1   WBCU 2     Most Recent ESR & CRP:  Recent Labs   Lab Test 05/05/24  1434 12/31/22  0004   SED  --  38*   CRP  --  <2.9   CRPI <3.00  --    ,   Results for orders placed or performed during the hospital encounter of 05/05/24   CT Abdomen Pelvis w Contrast    Narrative    EXAM: CT ABDOMEN PELVIS W CONTRAST  LOCATION: [facility]  DATE: 5/5/2024    INDICATION: acute onset episgastric and luq pain radiating into left shoulder  COMPARISON: CT abdomen pelvis 12/31/2022  TECHNIQUE: CT scan of the abdomen and pelvis was performed following injection of  IV contrast. Multiplanar reformats were obtained. Dose reduction techniques were used.  CONTRAST: 78 mL Isovue 370    FINDINGS:   LOWER CHEST: Normal.    HEPATOBILIARY: Normal liver contours. Focal fat deposition adjacent to the falciform. No worrisome liver lesions. Normal gallbladder. No biliary ductal dilation.    PANCREAS: Normal.    SPLEEN: Normal.    ADRENAL GLANDS: Normal.    KIDNEYS/BLADDER: Kidneys enhance symmetrically. Benign cyst in the right upper pole, which requires no follow-up. Renal vascular calcifications, however no urolithiasis or hydronephrosis. Urinary bladder is unremarkable.    BOWEL: Normal caliber small and large bowel. No acute inflammatory changes. Scattered colonic diverticulosis. Normal appendix. No free fluid or free air.    LYMPH NODES: No lymphadenopathy.    VASCULATURE: The abdominal aorta is nonaneurysmal with moderate atheromatous disease.    PELVIC ORGANS: Probable uterine fibroids. No worrisome adnexal mass.    MUSCULOSKELETAL: Lower lumbar spinal fusion hardware. Scattered degenerative changes. No destructive osseous lesions.      Impression    IMPRESSION:   No acute abnormality in the abdomen or pelvis.   XR Chest 2 Views    Narrative    EXAM: XR CHEST 2 VIEWS  LOCATION: [facility]  DATE: 5/5/2024    INDICATION: pain  COMPARISON: None.      Impression    IMPRESSION: Negative chest.   CT Chest Pulmonary Embolism w Contrast    Narrative    EXAM: CT CHEST PULMONARY EMBOLISM W CONTRAST  LOCATION: [facility]  DATE: 5/5/2024    INDICATION: elevated d dimer and pain.    COMPARISON: None.  TECHNIQUE: CT chest pulmonary angiogram during arterial phase injection of IV contrast. Multiplanar reformats and MIP reconstructions were performed. Dose reduction techniques were used.   CONTRAST: 54 mL Isovue-370    FINDINGS:  ANGIOGRAM CHEST: Pulmonary arteries are normal caliber and negative for  pulmonary emboli. Thoracic aorta is negative for dissection. No CT evidence of right heart strain.    LUNGS AND PLEURA: Bibasilar atelectasis. Mild mosaic attenuation of the lungs bilaterally suggesting small airway disease. No focal consolidation or effusion.    MEDIASTINUM/AXILLAE: Heart is normal in size. No mediastinal, axillary, or hilar adenopathy.    CORONARY ARTERY CALCIFICATION: Moderate.    UPPER ABDOMEN: Small stones noted within the right kidney.    MUSCULOSKELETAL: Degenerative changes of the spine.      Impression    IMPRESSION:  1.  No pulmonary embolism.  2.  Mild mosaic attenuation of the lungs bilaterally suggesting small airway disease.     MR Thoracic Spine w/o Contrast    Narrative    EXAM: MR THORACIC SPINE W/O CONTRAST  5/6/2024 5:31 PM     HISTORY: acute severe neuropathic pain L mid back to upper abdomen  (thoracic dermatomes). h.o Low back surgery.       COMPARISON: Chest CT 5/5/2024    TECHNIQUE: Sagittal T1-weighted, sagittal T2-weighted, sagittal STIR,  sagittal diffusion weighted (with ADC map), and axial T2-weighted  images of the thoracic spine were obtained without intravenous  contrast.    FINDINGS:  No signal changes to suggest acute fracture or traumatic subluxation.  No suspicious marrow lesion. Normal cord signal. No significant spinal  canal or neuroforaminal stenosis. Preserved intervertebral disc  height. Partially visualized right renal cyst.      Impression    IMPRESSION: No abnormality in the thoracic spine to explain patient's  symptoms.    I have personally reviewed the examination and initial interpretation  and I agree with the findings.    JORDY GARCIA MD         SYSTEM ID:  [id number]       Discharge Medications   Current Discharge Medication List        START taking these medications    Details   Lidocaine (LIDOCARE) 4 % Patch Place 2 patches onto the skin every 24 hours Apply to painful areas over left abdomen and left flank every morning, then remove after 12  hours. To prevent lidocaine toxicity, patient should be patch free for 12 hrs daily.Apply patch(s) to sore area. To prevent lidocaine toxicity, patient should be patch free for 12 hrs daily.  Qty: 20 patch, Refills: 0    Associated Diagnoses: Left flank pain      ondansetron (ZOFRAN ODT) 4 MG ODT tab Take 1 tablet (4 mg) by mouth every 6 hours as needed for nausea or vomiting  Qty: 30 tablet, Refills: 0    Associated Diagnoses: Nausea      oxyCODONE (ROXICODONE) 5 MG tablet Take 1-2 tablets (5-10 mg) by mouth every 4 hours as needed for moderate to severe pain  Qty: 25 tablet, Refills: 0    Associated Diagnoses: Left flank pain           CONTINUE these medications which have CHANGED    Details   gabapentin (NEURONTIN) 300 MG capsule Take 1 capsule (300 mg) by mouth 2 times daily  Qty: 30 capsule, Refills: 0    Associated Diagnoses: Left flank pain           CONTINUE these medications which have NOT CHANGED    Details   acetaminophen (TYLENOL) 325 MG tablet Take 2 tablets by mouth every 4 hours as needed      ASPIRIN PO Take 81 mg by mouth      atorvastatin (LIPITOR) 20 MG tablet Take 20 mg by mouth daily      calcium carb 1250 mg, 500 mg [place],/vitamin D 200 units (OSCAL WITH D) 500-200 MG-UNIT per tablet Take 1 tablet by mouth 2 times daily (with meals)      insulin detemir (LEVEMIR PEN) 100 UNIT/ML pen Inject 40 Units Subcutaneous at bedtime      lisinopril (ZESTRIL) 5 MG tablet Take 5 mg by mouth daily      metFORMIN (GLUCOPHAGE-XR) 750 MG 24 hr tablet Take 1,500 mg by mouth daily (with dinner)      OMEPRAZOLE PO Take 40 mg by mouth every morning      semaglutide (OZEMPIC) 2 MG/1.5ML pen Inject 1 mg Subcutaneous every 7 days      VITAMIN D, CHOLECALCIFEROL, PO Take 1,000 Units by mouth daily           Allergies   No Known Allergies

## 2024-05-07 NOTE — PLAN OF CARE
"Goal Outcome Evaluation:      Plan of Care Reviewed With: patient    Overall Patient Progress: improvingOverall Patient Progress: improving       VS: /61 (BP Location: Right arm)   Pulse 96   Temp 98.5  F (36.9  C) (Oral)   Resp 16   Ht 1.575 m (5' 2\")   Wt 74.4 kg (164 lb 0.4 oz)   LMP 03/10/2016   SpO2 96%   BMI 30.00 kg/m       O2: SpO2 > 90% on RA. Denies SOB/chest pain.    Output: Voids adequately and spontaneously into toilet.    Last BM: 5/4    Activity: SBA    Skin: Redness to sacrum, pt reports burning sensation under skin    Pain: Managed with PRNs and scheduled.   Nausea and emesis this AM resolved with zofran and compazine. Pt says this is chronic due to her ozempic.    CMS: A&Ox4. Baseline numbness to L foot.    Dressing: None   Diet: Regular - needs help ordering. Can speak english but has trouble with reading.    LDA: Removed   Equipment: IV pole, personal belongings    Plan: Discharge    Additional Info:        DISCHARGE SUMMARY    Pt discharging to: Home  Transportation: Daughter   AVS given and discussed: Pt was given AVS and pt states understanding of content. Pt has no further questions.   Stoplight Tool given and discussed: Yes, no further questions.  Medications given: Yes, discussed. No further questions.   Belongings returned: Yes, ensured all belongings packed and sent with pt. No items in security.   Comments: Escorted safely to elevators. Pt left at 1340.                "

## 2024-05-07 NOTE — CONSULTS
"Pain Service Consultation Note  [facility]      Patient Name: Sandy Kline  MRN: [id number]   Age: 54 year old  Sex: female  Date: May 7, 2024                                      Reviewed: Yes    Referring Provider:  Markus Nance MD  Referring Service:  medicine  Reason for Consultation: \"     acute severe, burning pain L upper abdomen and mid back, along ~t6-9 dermatome : related to spine vs herpetic neuralgia.\"       Assessment/Recommendations:  Sandy Kline is a 54 year old female who has PMH of  HTN, IDDM, GERD, and chronic gastritis admitted on 5/5/24 on Observation status after presenting to ED with one day history of acute severe LUQ abd pain that radiates into L back.   Thoracic MRI on 5/6/24 showed no acute spine pathology to explain patient's symptoms.  There is no rash at site of pain.  Lipase, ALT, AST within normal limits.    Today, Sandy reports pain at the left anterior T8-9, below the left breast.  States pain is a deep/burning type pain.   Denies any injuries.  States pain level is 4/10 which is improved from previous days of 8/10. Feels lidocaine patches are providing this relief.  Discussed that at this time, etiology of her pain is unclear.  Reviewed  labs and imaging studies.  Discussed symptom managing her at this time.  Note that she has been on Ozempic for approximately the past year with wt loss and it's possible that this is contributing to the abdominal pain.  Note that she has h/o abdominal pain. Advised her to follow with PCP/and or Ozempic prescriber.     Pain recommendations reviewed below as she is likely discharging today.    Plan:   Acetaminophen 97mgPO Q 8 hours  Oxycodone 5-10mg PO Q 4 hour PRN  Lidocaine patches-very helpful.  Menthol patches  Restart gabapentin 300mg PO  BID  (previously on this \"PRN\" for right knee pain and abdominal pain). Denies side effects.  Bowel regimen    Thank you for the opportunity to participate in the care of Sandy" PAVAN Kline  Pain Service will sign off.    Discussed with attending anesthesiologist  Primary Service Contacted with Recommendations? Yes    Precious Muller PA-C  5/7/2024          Per [place]  [place] pulled from [place] on 05/07/24.   12/19/2023 12/06/2023 1 Gabapentin 300 Mg Capsule 60.00 30 Ca Fig [id number] Ronaldo ([id number]) 0/11  [place]   08/28/2023 08/28/2023 2 Gabapentin 300 Mg Capsule 60.00 60 Ca Fig [id number] Gra (3255) 0/11  [place]   05/31/2023 12/29/2022 1 Gabapentin 300 Mg Capsule 60.00 30 Nd Dhruv [id number] Ronaldo ([id number]) 1/12        Pain Medications/Prescriber: Berto Hester MD     Past Medical History:  Past Medical History:   Diagnosis Date    Diabetes mellitus (H)          Family History:    No family history on file.    Social History:  Social History     Tobacco Use    Smoking status: Every Day     Current packs/day: 0.25     Types: Cigarettes    Smokeless tobacco: Not on file   Substance Use Topics    Alcohol use: No             Review of Systems:  Complete ROS reviewed. Unless otherwise noted, all other systems found to be negative.        Laboratory Results:  Recent Labs   Lab Test 05/05/24  0828 01/14/24 2013 12/31/22  0004   INR  --   --  0.90      < > 208   BUN 19.1   < > 17    < > = values in this interval not displayed.       Allergies:  No Known Allergies      Current Pain Related Medications:  Medications related to Pain Management (From now, onward)      Start     Dose/Rate Route Frequency Ordered Stop    05/06/24 1800  polyethylene glycol (MIRALAX) Packet 17 g         17 g Oral DAILY 05/06/24 1738      05/06/24 1738  diphenhydrAMINE (BENADRYL) capsule 25 mg         25 mg Oral EVERY 6 HOURS PRN 05/06/24 1738      05/06/24 1400  capsaicin (ZOSTRIX) 0.075 % cream          Topical 3 TIMES DAILY 05/06/24 1239      05/06/24 1300  gabapentin (NEURONTIN) capsule 300 mg         300 mg Oral DAILY 05/06/24 1249      05/06/24 1300  Lidocaine (LIDOCARE) 4 % Patch 2-3 patch         2-3 patch  over 12  "Hours Transdermal EVERY 24 HOURS 0800 05/06/24 1249      05/05/24 2136  oxyCODONE (ROXICODONE) tablet 5-10 mg         5-10 mg Oral EVERY 4 HOURS PRN 05/05/24 2136      05/05/24 2136  HYDROmorphone (PF) (DILAUDID) injection 0.3-0.5 mg         0.3-0.5 mg Intravenous EVERY 2 HOURS PRN 05/05/24 2136      05/05/24 1549  senna-docusate (SENOKOT-S/PERICOLACE) 8.6-50 MG per tablet 1 tablet        Placed in \"Or\" Linked Group    1 tablet Oral 2 TIMES DAILY PRN 05/05/24 1549      05/05/24 1549  senna-docusate (SENOKOT-S/PERICOLACE) 8.6-50 MG per tablet 2 tablet        Placed in \"Or\" Linked Group    2 tablet Oral 2 TIMES DAILY PRN 05/05/24 1549      05/05/24 1549  lidocaine 1 % 0.1-1 mL         0.1-1 mL Other EVERY 1 HOUR PRN 05/05/24 1549      05/05/24 1549  lidocaine (LMX4) cream          Topical EVERY 1 HOUR PRN 05/05/24 1549      05/05/24 1545  acetaminophen (TYLENOL) tablet 975 mg         975 mg Oral EVERY 6 HOURS PRN 05/05/24 1545                Physical Exam:  Vitals: /69 (BP Location: Right arm)   Pulse 98   Temp 98.1  F (36.7  C) (Oral)   Resp 16   Ht 1.575 m (5' 2\")   Wt 74.4 kg (164 lb 0.4 oz)   LMP 03/10/2016   SpO2 95%   BMI 30.00 kg/m      Physical Exam:     CONSTITUTIONAL/GENERAL APPEARANCE:  NAD  EYES: EOMI, sclera anicteric, PERRLA  ENT/NECK: atraumatic, lips and oral mucous membranes dry  RESPIRATORY: non-labored breathing. No cough, wheeze  CV: RRR, no audible rubs, gallops, or murmurs  ABDOMEN: Soft, non-tender, non-distended  MUSCULOSKELETAL/BACK/SPINE/EXTREMITIES: Moves all extremities purposefully.  pain with palpation on left paraspinal muscles at T7,8,9 and left anterior T7,8,9  NEURO: Alert and Oriented x3. Answers questions appropriately  SKIN/VASCULAR EXAM:  No jaundice, no visible rashes or lesions, dry and warm            Acute Inpatient Pain Service [facility]  Hours of pain coverage 24/7   Page via [employer]- Please Page the Pain Team Via [facility]: \"PAIN MANAGEMENT ACUTE INPATIENT/ [facility] " "BANK\"           "

## 2024-06-21 ENCOUNTER — OFFICE VISIT (OUTPATIENT)
Dept: URGENT CARE | Facility: URGENT CARE | Age: 54
End: 2024-06-21
Payer: COMMERCIAL

## 2024-06-21 VITALS
TEMPERATURE: 98.5 F | OXYGEN SATURATION: 96 % | WEIGHT: 166.7 LBS | DIASTOLIC BLOOD PRESSURE: 86 MMHG | RESPIRATION RATE: 16 BRPM | SYSTOLIC BLOOD PRESSURE: 146 MMHG | HEART RATE: 111 BPM | BODY MASS INDEX: 30.49 KG/M2

## 2024-06-21 DIAGNOSIS — E11.65 TYPE 2 DIABETES MELLITUS WITH HYPERGLYCEMIA, UNSPECIFIED WHETHER LONG TERM INSULIN USE (H): ICD-10-CM

## 2024-06-21 DIAGNOSIS — R30.0 DYSURIA: ICD-10-CM

## 2024-06-21 DIAGNOSIS — N30.00 ACUTE CYSTITIS WITHOUT HEMATURIA: Primary | ICD-10-CM

## 2024-06-21 LAB
CLUE CELLS: ABNORMAL
RBC #/AREA URNS AUTO: ABNORMAL /HPF
SQUAMOUS #/AREA URNS AUTO: ABNORMAL /LPF
TRICHOMONAS, WET PREP: ABNORMAL
WBC #/AREA URNS AUTO: ABNORMAL /HPF
WBC'S/HIGH POWER FIELD, WET PREP: ABNORMAL
YEAST, WET PREP: ABNORMAL

## 2024-06-21 PROCEDURE — 87210 SMEAR WET MOUNT SALINE/INK: CPT | Performed by: PHYSICIAN ASSISTANT

## 2024-06-21 PROCEDURE — 99203 OFFICE O/P NEW LOW 30 MIN: CPT | Performed by: PHYSICIAN ASSISTANT

## 2024-06-21 PROCEDURE — 87086 URINE CULTURE/COLONY COUNT: CPT | Performed by: PHYSICIAN ASSISTANT

## 2024-06-21 PROCEDURE — 81015 MICROSCOPIC EXAM OF URINE: CPT | Performed by: PHYSICIAN ASSISTANT

## 2024-06-21 PROCEDURE — 87186 SC STD MICRODIL/AGAR DIL: CPT | Performed by: PHYSICIAN ASSISTANT

## 2024-06-21 RX ORDER — NITROFURANTOIN 25; 75 MG/1; MG/1
100 CAPSULE ORAL 2 TIMES DAILY
Qty: 10 CAPSULE | Refills: 0 | Status: SHIPPED | OUTPATIENT
Start: 2024-06-21 | End: 2024-06-26

## 2024-06-21 RX ORDER — FLUCONAZOLE 150 MG/1
150 TABLET ORAL ONCE
Qty: 1 TABLET | Refills: 0 | Status: SHIPPED | OUTPATIENT
Start: 2024-06-21 | End: 2024-06-21

## 2024-06-21 NOTE — PROGRESS NOTES
Chief Complaint   Patient presents with    UTI     Burning when urinating, urinary frequency and chills for about a week         Results for orders placed or performed in visit on 06/21/24   Urine Microscopic Exam     Status: Abnormal   Result Value Ref Range    RBC Urine 2-5 (A) 0-2 /HPF /HPF    WBC Urine 10-25 (A) 0-5 /HPF /HPF    Squamous Epithelials Urine Moderate (A) None Seen /LPF   Wet preparation     Status: Abnormal    Specimen: Vagina; Swab   Result Value Ref Range    Trichomonas Absent Absent    Yeast Absent Absent    Clue Cells Absent Absent    WBCs/high power field 2+ (A) None           ASSESSMENT:    ICD-10-CM    1. Acute cystitis without hematuria  N30.00 nitroFURantoin macrocrystal-monohydrate (MACROBID) 100 MG capsule     fluconazole (DIFLUCAN) 150 MG tablet      2. Dysuria  R30.0 UA Macroscopic with reflex to Microscopic and Culture - Lab Collect     Wet preparation     Urine Microscopic Exam     Urine Culture     nitroFURantoin macrocrystal-monohydrate (MACROBID) 100 MG capsule     fluconazole (DIFLUCAN) 150 MG tablet     CANCELED: UA Macroscopic with reflex to Microscopic and Culture - Lab Collect      3. Type 2 diabetes mellitus with hyperglycemia, unspecified whether long term insulin use (H)  E11.65 nitroFURantoin macrocrystal-monohydrate (MACROBID) 100 MG capsule     fluconazole (DIFLUCAN) 150 MG tablet              PLAN: UTI.  Macrobid twice daily for 5 days.  Urine culture pending.  Diflucan in case she develops yeast infection.  As per ordered above.  Drink plenty of fluids.  Prevention and treatment of UTI's discussed. Follow up with primary care physician if not improving.  Advised about symptoms which might herald more serious problems.          Tasha Jefferson PA-C        Subjective:   54-year-old female presents for evaluation of dysuria for 1 week.  Used over-the-counter Azo.  Some bilateral lower back discomfort and bladder pressure.  No fever.  Some chills.  No vomiting or diarrhea.   History of diabetes.  Type II diabetic.    No Known Allergies    Past Medical History:   Diagnosis Date    Diabetes mellitus (H)        Current Outpatient Medications   Medication Sig Dispense Refill    acetaminophen (TYLENOL) 325 MG tablet Take 2 tablets by mouth every 4 hours as needed      ASPIRIN PO Take 81 mg by mouth      atorvastatin (LIPITOR) 20 MG tablet Take 20 mg by mouth daily      calcium carb 1250 mg, 500 mg [place],/vitamin D 200 units (OSCAL WITH D) 500-200 MG-UNIT per tablet Take 1 tablet by mouth 2 times daily (with meals)      gabapentin (NEURONTIN) 300 MG capsule Take 1 capsule (300 mg) by mouth 2 times daily 30 capsule 0    insulin detemir (LEVEMIR PEN) 100 UNIT/ML pen Inject 40 Units Subcutaneous at bedtime      Lidocaine (LIDOCARE) 4 % Patch Place 2 patches onto the skin every 24 hours Apply to painful areas over left abdomen and left flank every morning, then remove after 12 hours. To prevent lidocaine toxicity, patient should be patch free for 12 hrs daily.Apply patch(s) to sore area. To prevent lidocaine toxicity, patient should be patch free for 12 hrs daily. 20 patch 0    lisinopril (ZESTRIL) 5 MG tablet Take 5 mg by mouth daily      metFORMIN (GLUCOPHAGE-XR) 750 MG 24 hr tablet Take 1,500 mg by mouth daily (with dinner)      OMEPRAZOLE PO Take 40 mg by mouth every morning      ondansetron (ZOFRAN ODT) 4 MG ODT tab Take 1 tablet (4 mg) by mouth every 6 hours as needed for nausea or vomiting 30 tablet 0    oxyCODONE (ROXICODONE) 5 MG tablet Take 1-2 tablets (5-10 mg) by mouth every 4 hours as needed for moderate to severe pain 25 tablet 0    semaglutide (OZEMPIC) 2 MG/1.5ML pen Inject 1 mg Subcutaneous every 7 days      VITAMIN D, CHOLECALCIFEROL, PO Take 1,000 Units by mouth daily       No current facility-administered medications for this visit.       Social History     Tobacco Use    Smoking status: Every Day     Current packs/day: 0.25     Types: Cigarettes   Substance Use Topics     Alcohol use: No    Drug use: No       ROS:  General: negative for fever  ABD: Denies abd pain  : as above    OBJECTIVE:  BP (!) 146/86 (BP Location: Left arm, Patient Position: Sitting, Cuff Size: Adult Regular)   Pulse 111   Temp 98.5  F (36.9  C) (Tympanic)   Resp 16   Wt 75.6 kg (166 lb 11.2 oz)   LMP 03/10/2016   SpO2 96%   BMI 30.49 kg/m     General:   awake, alert, and cooperative.  NAD.   Head: Normocephalic, atraumatic.  Eyes: Conjunctiva clear, non icteric.   ABD: soft, no tenderness to palpation , no rigidity, guarding or rebound . No CVAT  Neuro: Alert and oriented - normal speech.

## 2024-06-23 LAB — BACTERIA UR CULT: ABNORMAL

## 2024-09-21 ENCOUNTER — HOSPITAL ENCOUNTER (EMERGENCY)
Facility: CLINIC | Age: 54
Discharge: HOME | End: 2024-09-21
Attending: EMERGENCY MEDICINE | Admitting: EMERGENCY MEDICINE
Payer: COMMERCIAL

## 2024-09-21 ENCOUNTER — APPOINTMENT (OUTPATIENT)
Dept: GENERAL RADIOLOGY | Facility: CLINIC | Age: 54
End: 2024-09-21
Attending: EMERGENCY MEDICINE
Payer: COMMERCIAL

## 2024-09-21 VITALS
RESPIRATION RATE: 16 BRPM | BODY MASS INDEX: 28.52 KG/M2 | DIASTOLIC BLOOD PRESSURE: 77 MMHG | OXYGEN SATURATION: 98 % | TEMPERATURE: 98.5 F | HEART RATE: 96 BPM | WEIGHT: 155 LBS | SYSTOLIC BLOOD PRESSURE: 109 MMHG | HEIGHT: 62 IN

## 2024-09-21 DIAGNOSIS — K29.70 GASTRITIS WITHOUT BLEEDING, UNSPECIFIED CHRONICITY, UNSPECIFIED GASTRITIS TYPE: ICD-10-CM

## 2024-09-21 DIAGNOSIS — R11.2 NAUSEA AND VOMITING, UNSPECIFIED VOMITING TYPE: ICD-10-CM

## 2024-09-21 DIAGNOSIS — R19.7 DIARRHEA, UNSPECIFIED TYPE: ICD-10-CM

## 2024-09-21 LAB
ALBUMIN SERPL BCG-MCNC: 4.8 G/DL (ref 3.5–5.2)
ALBUMIN UR-MCNC: 100 MG/DL
ALP SERPL-CCNC: 111 U/L (ref 40–150)
ALT SERPL W P-5'-P-CCNC: 16 U/L (ref 0–50)
ANION GAP SERPL CALCULATED.3IONS-SCNC: 13 MMOL/L (ref 7–15)
APPEARANCE UR: ABNORMAL
AST SERPL W P-5'-P-CCNC: 28 U/L (ref 0–45)
ATRIAL RATE - MUSE: 122 BPM
BACTERIA #/AREA URNS HPF: ABNORMAL /HPF
BASE EXCESS BLDA CALC-SCNC: -3 MMOL/L (ref -3–3)
BASE EXCESS BLDV CALC-SCNC: -4 MMOL/L (ref -3–3)
BASOPHILS # BLD AUTO: 0 10E3/UL (ref 0–0.2)
BASOPHILS NFR BLD AUTO: 0 %
BILIRUB SERPL-MCNC: 0.6 MG/DL
BILIRUB UR QL STRIP: ABNORMAL
BUN SERPL-MCNC: 18.9 MG/DL (ref 6–20)
CA-I BLD-MCNC: 4.9 MG/DL (ref 4.4–5.2)
CALCIUM SERPL-MCNC: 9.8 MG/DL (ref 8.8–10.4)
CHLORIDE SERPL-SCNC: 102 MMOL/L (ref 98–107)
COLOR UR AUTO: YELLOW
CPB POCT: NO
CREAT SERPL-MCNC: 0.88 MG/DL (ref 0.51–0.95)
DIASTOLIC BLOOD PRESSURE - MUSE: NORMAL MMHG
EGFRCR SERPLBLD CKD-EPI 2021: 77 ML/MIN/1.73M2
EOSINOPHIL # BLD AUTO: 0 10E3/UL (ref 0–0.7)
EOSINOPHIL NFR BLD AUTO: 0 %
ERYTHROCYTE [DISTWIDTH] IN BLOOD BY AUTOMATED COUNT: 16.2 % (ref 10–15)
FLUAV RNA SPEC QL NAA+PROBE: NEGATIVE
FLUBV RNA RESP QL NAA+PROBE: NEGATIVE
GLUCOSE BLD-MCNC: 125 MG/DL (ref 70–99)
GLUCOSE SERPL-MCNC: 125 MG/DL (ref 70–99)
GLUCOSE UR STRIP-MCNC: NEGATIVE MG/DL
HCO3 BLDA-SCNC: 20 MMOL/L (ref 21–28)
HCO3 BLDV-SCNC: 20 MMOL/L (ref 21–28)
HCO3 SERPL-SCNC: 22 MMOL/L (ref 22–29)
HCT VFR BLD AUTO: 41.2 % (ref 35–47)
HCT VFR BLD CALC: 38 % (ref 35–47)
HGB BLD-MCNC: 12.9 G/DL (ref 11.7–15.7)
HGB BLD-MCNC: 13.5 G/DL (ref 11.7–15.7)
HGB UR QL STRIP: NEGATIVE
HOLD SPECIMEN: NORMAL
HOLD SPECIMEN: NORMAL
HYALINE CASTS: 30 /LPF
IMM GRANULOCYTES # BLD: 0 10E3/UL
IMM GRANULOCYTES NFR BLD: 0 %
INTERPRETATION ECG - MUSE: NORMAL
KETONES UR STRIP-MCNC: 20 MG/DL
LACTATE BLD-SCNC: 1 MMOL/L
LACTATE SERPL-SCNC: 1.2 MMOL/L (ref 0.7–2)
LEUKOCYTE ESTERASE UR QL STRIP: ABNORMAL
LIPASE SERPL-CCNC: 29 U/L (ref 13–60)
LYMPHOCYTES # BLD AUTO: 0.2 10E3/UL (ref 0.8–5.3)
LYMPHOCYTES NFR BLD AUTO: 2 %
MAGNESIUM SERPL-MCNC: 1.1 MG/DL (ref 1.7–2.3)
MAGNESIUM SERPL-MCNC: 2.4 MG/DL (ref 1.7–2.3)
MCH RBC QN AUTO: 25.4 PG (ref 26.5–33)
MCHC RBC AUTO-ENTMCNC: 32.8 G/DL (ref 31.5–36.5)
MCV RBC AUTO: 77 FL (ref 78–100)
MONOCYTES # BLD AUTO: 0.5 10E3/UL (ref 0–1.3)
MONOCYTES NFR BLD AUTO: 5 %
MUCOUS THREADS #/AREA URNS LPF: PRESENT /LPF
NEUTROPHILS # BLD AUTO: 9.6 10E3/UL (ref 1.6–8.3)
NEUTROPHILS NFR BLD AUTO: 93 %
NITRATE UR QL: NEGATIVE
NRBC # BLD AUTO: 0 10E3/UL
NRBC BLD AUTO-RTO: 0 /100
P AXIS - MUSE: 51 DEGREES
PCO2 BLDA: 31 MM HG (ref 35–45)
PCO2 BLDV: 31 MM HG (ref 40–50)
PH BLDA: 7.43 [PH] (ref 7.35–7.45)
PH BLDV: 7.41 [PH] (ref 7.32–7.43)
PH UR STRIP: 5.5 [PH] (ref 5–7)
PHOSPHATE SERPL-MCNC: 2.7 MG/DL (ref 2.5–4.5)
PLATELET # BLD AUTO: 223 10E3/UL (ref 150–450)
PO2 BLDA: 48 MM HG (ref 80–105)
PO2 BLDV: 51 MM HG (ref 25–47)
POTASSIUM BLD-SCNC: 3.8 MMOL/L (ref 3.4–5.3)
POTASSIUM SERPL-SCNC: 4 MMOL/L (ref 3.4–5.3)
PR INTERVAL - MUSE: 124 MS
PROT SERPL-MCNC: 8.7 G/DL (ref 6.4–8.3)
QRS DURATION - MUSE: 82 MS
QT - MUSE: 316 MS
QTC - MUSE: 450 MS
R AXIS - MUSE: 28 DEGREES
RBC # BLD AUTO: 5.32 10E6/UL (ref 3.8–5.2)
RBC URINE: 2 /HPF
RSV RNA SPEC NAA+PROBE: NEGATIVE
SAO2 % BLDA: 86 % (ref 92–100)
SAO2 % BLDV: 87 % (ref 70–75)
SARS-COV-2 RNA RESP QL NAA+PROBE: NEGATIVE
SODIUM BLD-SCNC: 139 MMOL/L (ref 135–145)
SODIUM SERPL-SCNC: 137 MMOL/L (ref 135–145)
SP GR UR STRIP: 1.03 (ref 1–1.03)
SQUAMOUS EPITHELIAL: 9 /HPF
SYSTOLIC BLOOD PRESSURE - MUSE: NORMAL MMHG
T AXIS - MUSE: 39 DEGREES
TROPONIN T SERPL HS-MCNC: 11 NG/L
TROPONIN T SERPL HS-MCNC: 11 NG/L
UROBILINOGEN UR STRIP-MCNC: 4 MG/DL
VENTRICULAR RATE- MUSE: 122 BPM
WBC # BLD AUTO: 10.4 10E3/UL (ref 4–11)
WBC URINE: 10 /HPF

## 2024-09-21 PROCEDURE — 83690 ASSAY OF LIPASE: CPT | Performed by: EMERGENCY MEDICINE

## 2024-09-21 PROCEDURE — 83735 ASSAY OF MAGNESIUM: CPT | Performed by: EMERGENCY MEDICINE

## 2024-09-21 PROCEDURE — 36415 COLL VENOUS BLD VENIPUNCTURE: CPT | Performed by: EMERGENCY MEDICINE

## 2024-09-21 PROCEDURE — 96365 THER/PROPH/DIAG IV INF INIT: CPT | Performed by: EMERGENCY MEDICINE

## 2024-09-21 PROCEDURE — 99285 EMERGENCY DEPT VISIT HI MDM: CPT | Performed by: EMERGENCY MEDICINE

## 2024-09-21 PROCEDURE — 250N000011 HC RX IP 250 OP 636: Performed by: EMERGENCY MEDICINE

## 2024-09-21 PROCEDURE — 93010 ELECTROCARDIOGRAM REPORT: CPT | Performed by: EMERGENCY MEDICINE

## 2024-09-21 PROCEDURE — 258N000003 HC RX IP 258 OP 636: Performed by: EMERGENCY MEDICINE

## 2024-09-21 PROCEDURE — 93005 ELECTROCARDIOGRAM TRACING: CPT | Performed by: EMERGENCY MEDICINE

## 2024-09-21 PROCEDURE — 87637 SARSCOV2&INF A&B&RSV AMP PRB: CPT | Performed by: EMERGENCY MEDICINE

## 2024-09-21 PROCEDURE — 83605 ASSAY OF LACTIC ACID: CPT | Performed by: EMERGENCY MEDICINE

## 2024-09-21 PROCEDURE — 96361 HYDRATE IV INFUSION ADD-ON: CPT | Performed by: EMERGENCY MEDICINE

## 2024-09-21 PROCEDURE — 250N000013 HC RX MED GY IP 250 OP 250 PS 637: Performed by: EMERGENCY MEDICINE

## 2024-09-21 PROCEDURE — 250N000009 HC RX 250: Performed by: EMERGENCY MEDICINE

## 2024-09-21 PROCEDURE — 84484 ASSAY OF TROPONIN QUANT: CPT | Performed by: EMERGENCY MEDICINE

## 2024-09-21 PROCEDURE — 99285 EMERGENCY DEPT VISIT HI MDM: CPT | Mod: 25 | Performed by: EMERGENCY MEDICINE

## 2024-09-21 PROCEDURE — 82803 BLOOD GASES ANY COMBINATION: CPT

## 2024-09-21 PROCEDURE — 71046 X-RAY EXAM CHEST 2 VIEWS: CPT

## 2024-09-21 PROCEDURE — 80053 COMPREHEN METABOLIC PANEL: CPT | Performed by: EMERGENCY MEDICINE

## 2024-09-21 PROCEDURE — 96366 THER/PROPH/DIAG IV INF ADDON: CPT | Performed by: EMERGENCY MEDICINE

## 2024-09-21 PROCEDURE — 82330 ASSAY OF CALCIUM: CPT

## 2024-09-21 PROCEDURE — 84100 ASSAY OF PHOSPHORUS: CPT | Performed by: EMERGENCY MEDICINE

## 2024-09-21 PROCEDURE — 81001 URINALYSIS AUTO W/SCOPE: CPT | Performed by: EMERGENCY MEDICINE

## 2024-09-21 PROCEDURE — 85025 COMPLETE CBC W/AUTO DIFF WBC: CPT | Performed by: EMERGENCY MEDICINE

## 2024-09-21 PROCEDURE — 96375 TX/PRO/DX INJ NEW DRUG ADDON: CPT | Performed by: EMERGENCY MEDICINE

## 2024-09-21 RX ORDER — ACETAMINOPHEN 500 MG
1000 TABLET ORAL ONCE
Status: COMPLETED | OUTPATIENT
Start: 2024-09-21 | End: 2024-09-21

## 2024-09-21 RX ORDER — KETOROLAC TROMETHAMINE 15 MG/ML
15 INJECTION, SOLUTION INTRAMUSCULAR; INTRAVENOUS ONCE
Status: COMPLETED | OUTPATIENT
Start: 2024-09-21 | End: 2024-09-21

## 2024-09-21 RX ORDER — ONDANSETRON 2 MG/ML
4 INJECTION INTRAMUSCULAR; INTRAVENOUS ONCE
Status: COMPLETED | OUTPATIENT
Start: 2024-09-21 | End: 2024-09-21

## 2024-09-21 RX ORDER — IBUPROFEN 600 MG/1
600 TABLET, FILM COATED ORAL ONCE
Status: COMPLETED | OUTPATIENT
Start: 2024-09-21 | End: 2024-09-21

## 2024-09-21 RX ORDER — MAGNESIUM HYDROXIDE/ALUMINUM HYDROXICE/SIMETHICONE 120; 1200; 1200 MG/30ML; MG/30ML; MG/30ML
30 SUSPENSION ORAL ONCE
Status: COMPLETED | OUTPATIENT
Start: 2024-09-21 | End: 2024-09-21

## 2024-09-21 RX ORDER — DIPHENHYDRAMINE HYDROCHLORIDE 50 MG/ML
25 INJECTION INTRAMUSCULAR; INTRAVENOUS ONCE
Status: COMPLETED | OUTPATIENT
Start: 2024-09-21 | End: 2024-09-21

## 2024-09-21 RX ORDER — MAGNESIUM SULFATE HEPTAHYDRATE 40 MG/ML
4 INJECTION, SOLUTION INTRAVENOUS ONCE
Status: COMPLETED | OUTPATIENT
Start: 2024-09-21 | End: 2024-09-21

## 2024-09-21 RX ADMIN — KETOROLAC TROMETHAMINE 15 MG: 15 INJECTION, SOLUTION INTRAMUSCULAR; INTRAVENOUS at 10:10

## 2024-09-21 RX ADMIN — ACETAMINOPHEN 1000 MG: 500 TABLET ORAL at 13:22

## 2024-09-21 RX ADMIN — SODIUM CHLORIDE 1000 ML: 9 INJECTION, SOLUTION INTRAVENOUS at 10:10

## 2024-09-21 RX ADMIN — MAGNESIUM SULFATE HEPTAHYDRATE 4 G: 40 INJECTION, SOLUTION INTRAVENOUS at 12:52

## 2024-09-21 RX ADMIN — PANTOPRAZOLE SODIUM 40 MG: 40 INJECTION, POWDER, FOR SOLUTION INTRAVENOUS at 10:10

## 2024-09-21 RX ADMIN — ALUMINUM HYDROXIDE, MAGNESIUM HYDROXIDE, AND SIMETHICONE 30 ML: 1200; 120; 1200 SUSPENSION ORAL at 13:23

## 2024-09-21 RX ADMIN — DIPHENHYDRAMINE HYDROCHLORIDE 25 MG: 50 INJECTION, SOLUTION INTRAMUSCULAR; INTRAVENOUS at 18:20

## 2024-09-21 RX ADMIN — IBUPROFEN 600 MG: 600 TABLET, FILM COATED ORAL at 18:20

## 2024-09-21 RX ADMIN — ONDANSETRON 4 MG: 2 INJECTION INTRAMUSCULAR; INTRAVENOUS at 16:45

## 2024-09-21 RX ADMIN — SODIUM CHLORIDE 1000 ML: 9 INJECTION, SOLUTION INTRAVENOUS at 12:52

## 2024-09-21 ASSESSMENT — ACTIVITIES OF DAILY LIVING (ADL)
ADLS_ACUITY_SCORE: 36

## 2024-09-21 ASSESSMENT — COLUMBIA-SUICIDE SEVERITY RATING SCALE - C-SSRS
6. HAVE YOU EVER DONE ANYTHING, STARTED TO DO ANYTHING, OR PREPARED TO DO ANYTHING TO END YOUR LIFE?: NO
2. HAVE YOU ACTUALLY HAD ANY THOUGHTS OF KILLING YOURSELF IN THE PAST MONTH?: NO
1. IN THE PAST MONTH, HAVE YOU WISHED YOU WERE DEAD OR WISHED YOU COULD GO TO SLEEP AND NOT WAKE UP?: NO

## 2024-09-21 NOTE — ED TRIAGE NOTES
Patient presents due to nausea, vomiting and diarrhea that started yesterday after having [employer]. Patient reports vomiting all through the night and diarrhea started at 0200 today. Patient has been unable to get any relief.    Triage Assessment (Adult)       Row Name 09/21/24 0917          Triage Assessment    Airway WDL WDL        Respiratory WDL    Respiratory WDL WDL        Skin Circulation/Temperature WDL    Skin Circulation/Temperature WDL WDL        Cardiac WDL    Cardiac WDL WDL        Peripheral/Neurovascular WDL    Peripheral Neurovascular WDL WDL        Cognitive/Neuro/Behavioral WDL    Cognitive/Neuro/Behavioral WDL WDL

## 2024-09-21 NOTE — ED PROVIDER NOTES
ED Provider Note  [facility]      History     Chief Complaint   Patient presents with    Nausea, Vomiting, & Diarrhea     Patient presents due to nausea, vomiting and diarrhea that started yesterday after having [employer] cheeseburger. Patient reports vomiting all through the night and diarrhea started at 0200 today. Patient has been unable to get any relief.      HPI  Sadny Kline is a 54 year old female with a past medical history of HTN, DM type II, GERD, chronic gastritis here with headache, vomiting, and diarrhea.  Patient reports watery green vomiting which started after having a cheeseburger yesterday, followed by a headache.  Around 0200 today, patient started having watery diarrhea. Patient has had around 20 episodes of vomiting and is unable to tolerate anything by mouth.  Patient also has chest pain with vomiting.  Patient has no fevers or chills, no pleuritic pain, shortness of breath, urinary symptoms, or red or black vomitus or diarrhea. No recent travel, antibiotic use, or long distance travel.    Past Medical History  Past Medical History:   Diagnosis Date    Diabetes mellitus (H)      Past Surgical History:   Procedure Laterality Date    carpel tunnel      ORTHOPEDIC SURGERY       acetaminophen (TYLENOL) 325 MG tablet  ASPIRIN PO  atorvastatin (LIPITOR) 20 MG tablet  calcium carb 1250 mg, 500 mg [place],/vitamin D 200 units (OSCAL WITH D) 500-200 MG-UNIT per tablet  gabapentin (NEURONTIN) 300 MG capsule  insulin detemir (LEVEMIR PEN) 100 UNIT/ML pen  Lidocaine (LIDOCARE) 4 % Patch  lisinopril (ZESTRIL) 5 MG tablet  metFORMIN (GLUCOPHAGE-XR) 750 MG 24 hr tablet  OMEPRAZOLE PO  ondansetron (ZOFRAN ODT) 4 MG ODT tab  oxyCODONE (ROXICODONE) 5 MG tablet  semaglutide (OZEMPIC) 2 MG/1.5ML pen  VITAMIN D, CHOLECALCIFEROL, PO      No Known Allergies  Family History  No family history on file.  Social History   Social History     Tobacco Use    Smoking status: Every Day     Current  "packs/day: 0.25     Types: Cigarettes   Substance Use Topics    Alcohol use: No    Drug use: No      A medically appropriate review of systems was performed with pertinent positives and negatives noted in the HPI, and all other systems negative.    Physical Exam   BP: 103/73  Pulse: (!) 130  Temp: 98.1  F (36.7  C)  Resp: 16  Height: 157.5 cm (5' 2\")  Weight: 70.3 kg (155 lb)  SpO2: 98 %  Physical Exam  Constitutional:       General: She is not in acute distress.     Appearance: She is normal weight. She is not ill-appearing.   HENT:      Head: Normocephalic and atraumatic.   Cardiovascular:      Rate and Rhythm: Regular rhythm. Tachycardia present.   Pulmonary:      Effort: Pulmonary effort is normal.      Breath sounds: Normal breath sounds.   Abdominal:      General: Abdomen is flat.      Tenderness: There is generalized abdominal tenderness and tenderness in the epigastric area. There is no guarding or rebound.   Skin:     General: Skin is warm and dry.      Coloration: Skin is not jaundiced or pale.   Neurological:      General: No focal deficit present.      Mental Status: She is alert.   Psychiatric:         Mood and Affect: Mood normal.     Normal neck ROM w/o meningeal signs.  No spontaneous nystagmus.  Grossly intact strength and sensation of bilateral upper and lower extremities.    Negative CVA tenderness.    ED Course, Procedures, & Data     ED Course as of 09/21/24 1013   Sat Sep 21, 2024   1008 Troponin T, High Sensitivity: 11     Procedures                Results for orders placed or performed during the hospital encounter of 09/21/24   [place] Draw     Status: None (In process)    Narrative    The following orders were created for panel order [place] Draw.  Procedure                               Abnormality         Status                     ---------                               -----------         ------                     Extra Green Top (Lithium...[[id number]]                      Final result "               Extra Purple Top Tube[[id number]]                            In process                   Please view results for these tests on the individual orders.   Extra Green Top (Lithium Heparin) Tube     Status: None   Result Value Ref Range    Hold Specimen jic    UA with Microscopic reflex to Culture     Status: Abnormal    Specimen: Urine, Clean Catch   Result Value Ref Range    Color Urine Yellow Colorless, Straw, Light Yellow, Yellow    Appearance Urine Slightly Cloudy (A) Clear    Glucose Urine Negative Negative mg/dL    Bilirubin Urine Small (A) Negative    Ketones Urine 20 (A) Negative mg/dL    Specific Gravity Urine 1.030 1.003 - 1.035    Blood Urine Negative Negative    pH Urine 5.5 5.0 - 7.0    Protein Albumin Urine 100 (A) Negative mg/dL    Urobilinogen Urine 4.0 (A) Normal, 2.0 mg/dL    Nitrite Urine Negative Negative    Leukocyte Esterase Urine Small (A) Negative    Bacteria Urine Moderate (A) None Seen /HPF    Mucus Urine Present (A) None Seen /LPF    RBC Urine 2 <=2 /HPF    WBC Urine 10 (H) <=5 /HPF    Squamous Epithelials Urine 9 (H) <=1 /HPF    Hyaline Casts Urine 30 (H) <=2 /LPF    Narrative    Urine Culture not indicated   Comprehensive metabolic panel     Status: Abnormal   Result Value Ref Range    Sodium 137 135 - 145 mmol/L    Potassium 4.0 3.4 - 5.3 mmol/L    Carbon Dioxide (CO2) 22 22 - 29 mmol/L    Anion Gap 13 7 - 15 mmol/L    Urea Nitrogen 18.9 6.0 - 20.0 mg/dL    Creatinine 0.88 0.51 - 0.95 mg/dL    GFR Estimate 77 >60 mL/min/1.73m2    Calcium 9.8 8.8 - 10.4 mg/dL    Chloride 102 98 - 107 mmol/L    Glucose 125 (H) 70 - 99 mg/dL    Alkaline Phosphatase 111 40 - 150 U/L    AST 28 0 - 45 U/L    ALT 16 0 - 50 U/L    Protein Total 8.7 (H) 6.4 - 8.3 g/dL    Albumin 4.8 3.5 - 5.2 g/dL    Bilirubin Total 0.6 <=1.2 mg/dL   Lipase     Status: Normal   Result Value Ref Range    Lipase 29 13 - 60 U/L   Troponin T, High Sensitivity     Status: Normal   Result Value Ref Range    Troponin T,  High Sensitivity 11 <=14 ng/L   CBC with platelets and differential     Status: Abnormal   Result Value Ref Range    WBC Count 10.4 4.0 - 11.0 10e3/uL    RBC Count 5.32 (H) 3.80 - 5.20 10e6/uL    Hemoglobin 13.5 11.7 - 15.7 g/dL    Hematocrit 41.2 35.0 - 47.0 %    MCV 77 (L) 78 - 100 fL    MCH 25.4 (L) 26.5 - 33.0 pg    MCHC 32.8 31.5 - 36.5 g/dL    RDW 16.2 (H) 10.0 - 15.0 %    Platelet Count 223 150 - 450 10e3/uL    % Neutrophils 93 %    % Lymphocytes 2 %    % Monocytes 5 %    % Eosinophils 0 %    % Basophils 0 %    % Immature Granulocytes 0 %    NRBCs per 100 WBC 0 <1 /100    Absolute Neutrophils 9.6 (H) 1.6 - 8.3 10e3/uL    Absolute Lymphocytes 0.2 (L) 0.8 - 5.3 10e3/uL    Absolute Monocytes 0.5 0.0 - 1.3 10e3/uL    Absolute Eosinophils 0.0 0.0 - 0.7 10e3/uL    Absolute Basophils 0.0 0.0 - 0.2 10e3/uL    Absolute Immature Granulocytes 0.0 <=0.4 10e3/uL    Absolute NRBCs 0.0 10e3/uL   Magnesium     Status: Abnormal   Result Value Ref Range    Magnesium 1.1 (L) 1.7 - 2.3 mg/dL   Phosphorus     Status: Normal   Result Value Ref Range    Phosphorus 2.7 2.5 - 4.5 mg/dL   CBC with platelets differential     Status: Abnormal    Narrative    The following orders were created for panel order CBC with platelets differential.  Procedure                               Abnormality         Status                     ---------                               -----------         ------                     CBC with platelets and d...[[id number]]  Abnormal            Final result                 Please view results for these tests on the individual orders.     Medications   sodium chloride 0.9% BOLUS 1,000 mL (1,000 mLs Intravenous $New Bag 9/21/24 1010)   pantoprazole (PROTONIX) IV push injection 40 mg (40 mg Intravenous $Given 9/21/24 1010)   ketorolac (TORADOL) injection 15 mg (15 mg Intravenous $Given 9/21/24 1010)     Labs Ordered and Resulted from Time of ED Arrival to Time of ED Departure   ROUTINE UA WITH MICROSCOPIC REFLEX  TO CULTURE - Abnormal       Result Value    Color Urine Yellow      Appearance Urine Slightly Cloudy (*)     Glucose Urine Negative      Bilirubin Urine Small (*)     Ketones Urine 20 (*)     Specific Gravity Urine 1.030      Blood Urine Negative      pH Urine 5.5      Protein Albumin Urine 100 (*)     Urobilinogen Urine 4.0 (*)     Nitrite Urine Negative      Leukocyte Esterase Urine Small (*)     Bacteria Urine Moderate (*)     Mucus Urine Present (*)     RBC Urine 2      WBC Urine 10 (*)     Squamous Epithelials Urine 9 (*)     Hyaline Casts Urine 30 (*)    COMPREHENSIVE METABOLIC PANEL - Abnormal    Sodium 137      Potassium 4.0      Carbon Dioxide (CO2) 22      Anion Gap 13      Urea Nitrogen 18.9      Creatinine 0.88      GFR Estimate 77      Calcium 9.8      Chloride 102      Glucose 125 (*)     Alkaline Phosphatase 111      AST 28      ALT 16      Protein Total 8.7 (*)     Albumin 4.8      Bilirubin Total 0.6     CBC WITH PLATELETS AND DIFFERENTIAL - Abnormal    WBC Count 10.4      RBC Count 5.32 (*)     Hemoglobin 13.5      Hematocrit 41.2      MCV 77 (*)     MCH 25.4 (*)     MCHC 32.8      RDW 16.2 (*)     Platelet Count 223      % Neutrophils 93      % Lymphocytes 2      % Monocytes 5      % Eosinophils 0      % Basophils 0      % Immature Granulocytes 0      NRBCs per 100 WBC 0      Absolute Neutrophils 9.6 (*)     Absolute Lymphocytes 0.2 (*)     Absolute Monocytes 0.5      Absolute Eosinophils 0.0      Absolute Basophils 0.0      Absolute Immature Granulocytes 0.0      Absolute NRBCs 0.0     MAGNESIUM - Abnormal    Magnesium 1.1 (*)    LIPASE - Normal    Lipase 29     TROPONIN T, HIGH SENSITIVITY - Normal    Troponin T, High Sensitivity 11     PHOSPHORUS - Normal    Phosphorus 2.7     INFLUENZA A/B, RSV, & SARS-COV2 PCR   LACTIC ACID WHOLE BLOOD WITH 1X REPEAT IN 2 HR WHEN >2     No orders to display            Assessment & Plan    Sandy Klnie is a 54 year old female with a past medical history of  HTN, DM type II, GERD, chronic gastritis here with headache, vomiting, and diarrhea.    Differential diagnosis includes but is not limited to cholecystitis, pancreatitis, gastritis, ulcers, mesenteric ischemia, UTI, less likely ACS or PE or subarachnoid hemorrhage. Patient's troponin, lipase, phosphorus, CBC, and CMP were WNL.  Magnesium was low at 1.1.  UA was positive for leukocyte esterase and moderate bacteria.  COVID/RSV/flu negative. Patient's headache and abdominal pain improved with IV fluids and Toradol however, she still felt nauseous and abdominal discomfort.  Patient given Zofran which resolved the patient's headache, abdominal pain, and nausea.     Disposition  Discharge pending p.o. intake trial.    I have reviewed the nursing notes. I have reviewed the findings, diagnosis, plan and need for follow up with the patient.    New Prescriptions    No medications on file       Final diagnoses:   None     Rowdy Zarate MS4  Medical Student  [facility] EMERGENCY DEPARTMENT  9/21/2024    --    ED Attending Physician Attestation    I Patricia Rapp DO, cared for this patient with the Medical Student. I performed, or re-performed, the physical exam and medical decision-making. I have verified the accuracy of all the medical student findings and documentation above, and have edited as necessary.    Summary of HPI, PE, ED Course   Patient is a 54 year old female evaluated in the emergency department for nausea, vomiting, abdominal pain and a headache. Exam and ED course notable for tachycardia in the 130s on arrival, normotensive.  Chest pain during vomiting.  Generalized tenderness throughout, mostly in the epigastrium without any peritoneal signs.  Afebrile.  No focal neurologic deficits, meningeal signs, infectious symptoms.  ECG with sinus tachycardia.  Normal chest x-ray and troponin.  Patient with hypomagnesemia, repleted in the emergency department.  Urine with small leukocytes, 10 WBC, multiple  hyaline casts.  No urinary symptoms, this will be reflexed to culture.  Patient alert and answering questions appropriately. After the completion of care in the emergency department, the patient felt improved, was able to tolerate p.o. without any difficulty and walked to and from the bathroom without issue.  She was discharged.  Discussed with following up with PCP or GI for repeat EGD, since it has been multiple years.  Also discussed appropriate PPI use.        Medical Decision Making  The patient's presentation was of high complexity (an acute health issue posing potential threat to life or bodily function).  The patient's evaluation involved:  review of external note(s) from 3+ sources (see separate area of note for details)  review of 3+ test result(s) ordered prior to this encounter (see separate area of note for details)  strong consideration of a test (see separate area of note for details) that was ultimately deferred  ordering and/or review of 3+ test(s) in this encounter (see separate area of note for details)  independent interpretation of testing performed by another health professional (see separate area of note for details)    The patient's management necessitated only low risk treatment.      Patricia Rapp DO  Emergency Medicine        Patricia Rapp DO  09/22/24 1110

## 2024-09-21 NOTE — DISCHARGE INSTRUCTIONS
Take tylenol as directed for pain.  If you need, take ibuprofen as well (with food to protect your stomach).    Follow up with your primary doctor or GI to discuss repeat EGD (upper scope to look inside your stomach).

## 2024-12-09 ENCOUNTER — HOSPITAL ENCOUNTER (EMERGENCY)
Facility: CLINIC | Age: 54
Discharge: HOME | End: 2024-12-09
Admitting: EMERGENCY MEDICINE
Payer: COMMERCIAL

## 2024-12-09 VITALS
BODY MASS INDEX: 28.89 KG/M2 | HEART RATE: 93 BPM | OXYGEN SATURATION: 97 % | DIASTOLIC BLOOD PRESSURE: 76 MMHG | SYSTOLIC BLOOD PRESSURE: 114 MMHG | RESPIRATION RATE: 16 BRPM | HEIGHT: 62 IN | WEIGHT: 157 LBS | TEMPERATURE: 97.9 F

## 2024-12-09 PROCEDURE — 99281 EMR DPT VST MAYX REQ PHY/QHP: CPT | Performed by: EMERGENCY MEDICINE

## 2024-12-09 NOTE — ED TRIAGE NOTES
Patient reports a boil on the upper part of her back. Patient reports it started as a small pimple and was like that for six months, and then a month and a half ago it got bigger. Patient reports she has been experiencing fevers because of it. Patient reports she tried to get into primary care but they could not schedule her until May.   Patient rates her pain a 8/10.     Patient has type 2 diabetes. Patient reports good control.

## 2025-03-16 ENCOUNTER — APPOINTMENT (OUTPATIENT)
Dept: CT IMAGING | Facility: CLINIC | Age: 55
End: 2025-03-16
Attending: EMERGENCY MEDICINE
Payer: COMMERCIAL

## 2025-03-16 ENCOUNTER — HOSPITAL ENCOUNTER (EMERGENCY)
Facility: CLINIC | Age: 55
Discharge: HOME | End: 2025-03-16
Attending: EMERGENCY MEDICINE | Admitting: EMERGENCY MEDICINE
Payer: COMMERCIAL

## 2025-03-16 VITALS
DIASTOLIC BLOOD PRESSURE: 74 MMHG | OXYGEN SATURATION: 97 % | HEART RATE: 87 BPM | SYSTOLIC BLOOD PRESSURE: 107 MMHG | TEMPERATURE: 98.1 F | RESPIRATION RATE: 17 BRPM

## 2025-03-16 DIAGNOSIS — N39.0 URINARY TRACT INFECTION WITHOUT HEMATURIA, SITE UNSPECIFIED: ICD-10-CM

## 2025-03-16 LAB
ALBUMIN UR-MCNC: ABNORMAL G/DL
ANION GAP SERPL CALCULATED.3IONS-SCNC: 12 MMOL/L (ref 7–15)
APPEARANCE UR: CLEAR
BACTERIA #/AREA URNS HPF: ABNORMAL /HPF
BASOPHILS # BLD AUTO: 0.1 10E3/UL (ref 0–0.2)
BASOPHILS NFR BLD AUTO: 1 %
BILIRUB UR QL STRIP: ABNORMAL
BUN SERPL-MCNC: 16.2 MG/DL (ref 6–20)
CALCIUM SERPL-MCNC: 9.5 MG/DL (ref 8.8–10.4)
CHLORIDE SERPL-SCNC: 102 MMOL/L (ref 98–107)
COLOR UR AUTO: ABNORMAL
CREAT SERPL-MCNC: 0.89 MG/DL (ref 0.51–0.95)
EGFRCR SERPLBLD CKD-EPI 2021: 76 ML/MIN/1.73M2
EOSINOPHIL # BLD AUTO: 0.2 10E3/UL (ref 0–0.7)
EOSINOPHIL NFR BLD AUTO: 2 %
ERYTHROCYTE [DISTWIDTH] IN BLOOD BY AUTOMATED COUNT: 14.8 % (ref 10–15)
GLUCOSE SERPL-MCNC: 97 MG/DL (ref 70–99)
GLUCOSE UR STRIP-MCNC: ABNORMAL MG/DL
HCG UR QL: NEGATIVE
HCO3 SERPL-SCNC: 24 MMOL/L (ref 22–29)
HCT VFR BLD AUTO: 34 % (ref 35–47)
HGB BLD-MCNC: 11 G/DL (ref 11.7–15.7)
HGB UR QL STRIP: ABNORMAL
IMM GRANULOCYTES # BLD: 0 10E3/UL
IMM GRANULOCYTES NFR BLD: 0 %
KETONES UR STRIP-MCNC: ABNORMAL MG/DL
LEUKOCYTE ESTERASE UR QL STRIP: ABNORMAL
LYMPHOCYTES # BLD AUTO: 2 10E3/UL (ref 0.8–5.3)
LYMPHOCYTES NFR BLD AUTO: 25 %
MCH RBC QN AUTO: 27 PG (ref 26.5–33)
MCHC RBC AUTO-ENTMCNC: 32.4 G/DL (ref 31.5–36.5)
MCV RBC AUTO: 84 FL (ref 78–100)
MONOCYTES # BLD AUTO: 0.7 10E3/UL (ref 0–1.3)
MONOCYTES NFR BLD AUTO: 8 %
MUCOUS THREADS #/AREA URNS LPF: PRESENT /LPF
NEUTROPHILS # BLD AUTO: 5.2 10E3/UL (ref 1.6–8.3)
NEUTROPHILS NFR BLD AUTO: 64 %
NITRATE UR QL: ABNORMAL
NRBC # BLD AUTO: 0 10E3/UL
NRBC BLD AUTO-RTO: 0 /100
PH UR STRIP: ABNORMAL [PH]
PLATELET # BLD AUTO: 246 10E3/UL (ref 150–450)
POTASSIUM SERPL-SCNC: 4.6 MMOL/L (ref 3.4–5.3)
RBC # BLD AUTO: 4.07 10E6/UL (ref 3.8–5.2)
RBC URINE: 1 /HPF
SODIUM SERPL-SCNC: 138 MMOL/L (ref 135–145)
SP GR UR STRIP: ABNORMAL
SQUAMOUS EPITHELIAL: 13 /HPF
UROBILINOGEN UR STRIP-MCNC: ABNORMAL MG/DL
WBC # BLD AUTO: 8.1 10E3/UL (ref 4–11)
WBC URINE: 23 /HPF

## 2025-03-16 PROCEDURE — 85014 HEMATOCRIT: CPT | Performed by: EMERGENCY MEDICINE

## 2025-03-16 PROCEDURE — 87186 SC STD MICRODIL/AGAR DIL: CPT | Performed by: EMERGENCY MEDICINE

## 2025-03-16 PROCEDURE — 80048 BASIC METABOLIC PNL TOTAL CA: CPT | Performed by: EMERGENCY MEDICINE

## 2025-03-16 PROCEDURE — 81003 URINALYSIS AUTO W/O SCOPE: CPT | Performed by: EMERGENCY MEDICINE

## 2025-03-16 PROCEDURE — 81025 URINE PREGNANCY TEST: CPT | Performed by: EMERGENCY MEDICINE

## 2025-03-16 PROCEDURE — 36415 COLL VENOUS BLD VENIPUNCTURE: CPT | Performed by: EMERGENCY MEDICINE

## 2025-03-16 PROCEDURE — 74177 CT ABD & PELVIS W/CONTRAST: CPT

## 2025-03-16 PROCEDURE — 85041 AUTOMATED RBC COUNT: CPT | Performed by: EMERGENCY MEDICINE

## 2025-03-16 PROCEDURE — 250N000013 HC RX MED GY IP 250 OP 250 PS 637: Performed by: EMERGENCY MEDICINE

## 2025-03-16 PROCEDURE — 250N000009 HC RX 250: Performed by: EMERGENCY MEDICINE

## 2025-03-16 PROCEDURE — 99285 EMERGENCY DEPT VISIT HI MDM: CPT | Mod: 25 | Performed by: EMERGENCY MEDICINE

## 2025-03-16 PROCEDURE — 82565 ASSAY OF CREATININE: CPT | Performed by: EMERGENCY MEDICINE

## 2025-03-16 PROCEDURE — 99284 EMERGENCY DEPT VISIT MOD MDM: CPT | Performed by: EMERGENCY MEDICINE

## 2025-03-16 PROCEDURE — 85004 AUTOMATED DIFF WBC COUNT: CPT | Performed by: EMERGENCY MEDICINE

## 2025-03-16 PROCEDURE — 250N000011 HC RX IP 250 OP 636: Performed by: EMERGENCY MEDICINE

## 2025-03-16 RX ORDER — NITROFURANTOIN 25; 75 MG/1; MG/1
100 CAPSULE ORAL ONCE
Status: COMPLETED | OUTPATIENT
Start: 2025-03-16 | End: 2025-03-16

## 2025-03-16 RX ORDER — NITROFURANTOIN 25; 75 MG/1; MG/1
100 CAPSULE ORAL 2 TIMES DAILY
Qty: 10 CAPSULE | Refills: 0 | Status: SHIPPED | OUTPATIENT
Start: 2025-03-16 | End: 2025-03-21

## 2025-03-16 RX ORDER — IOPAMIDOL 755 MG/ML
100 INJECTION, SOLUTION INTRAVASCULAR ONCE
Status: COMPLETED | OUTPATIENT
Start: 2025-03-16 | End: 2025-03-16

## 2025-03-16 RX ADMIN — NITROFURANTOIN MONOHYDRATE/MACROCRYSTALS 100 MG: 75; 25 CAPSULE ORAL at 20:28

## 2025-03-16 RX ADMIN — SODIUM CHLORIDE 59 ML: 9 INJECTION, SOLUTION INTRAVENOUS at 18:27

## 2025-03-16 RX ADMIN — IOPAMIDOL 77 ML: 755 INJECTION, SOLUTION INTRAVENOUS at 18:27

## 2025-03-16 ASSESSMENT — ACTIVITIES OF DAILY LIVING (ADL)
ADLS_ACUITY_SCORE: 55
ADLS_ACUITY_SCORE: 57

## 2025-03-16 ASSESSMENT — COLUMBIA-SUICIDE SEVERITY RATING SCALE - C-SSRS
1. IN THE PAST MONTH, HAVE YOU WISHED YOU WERE DEAD OR WISHED YOU COULD GO TO SLEEP AND NOT WAKE UP?: NO
2. HAVE YOU ACTUALLY HAD ANY THOUGHTS OF KILLING YOURSELF IN THE PAST MONTH?: NO
6. HAVE YOU EVER DONE ANYTHING, STARTED TO DO ANYTHING, OR PREPARED TO DO ANYTHING TO END YOUR LIFE?: NO

## 2025-03-16 NOTE — ED PROVIDER NOTES
[facility] EMERGENCY DEPARTMENT ([facility])    3/16/25      ED PROVIDER NOTE   Vertical Triage B  History     Chief Complaint   Patient presents with    Dysuria     Pt reports pain with urination for 3 days now.     The history is provided by the patient and medical records.     Sandy Kline is a 54 year old female with history of type 2 diabetes who presents with pain with urination for the past 3 days.    Epic records reviewed, she has had prior UTI in the past, urine culture on 6/21/2024 grew out E. coli that was resistant to Trimethoprim/Sulfamethoxazole.    Patient reports 3 days of dysuria also with lower abdominal pain and subjective chills.  No flank pain, no fevers, no vomiting.  No vaginal discharge or other vaginal concerns or complaints.  Pain is primarily right lower quadrant.  Patient been taking  Pyridium that does improve the symptoms but they return after the Pyridium wears off.    Past Medical History  Past Medical History:   Diagnosis Date    Diabetes mellitus (H)      Past Surgical History:   Procedure Laterality Date    carpel tunnel      ORTHOPEDIC SURGERY       nitroFURantoin macrocrystal-monohydrate (MACROBID) 100 MG capsule  acetaminophen (TYLENOL) 325 MG tablet  ASPIRIN PO  atorvastatin (LIPITOR) 20 MG tablet  calcium carb 1250 mg, 500 mg [place],/vitamin D 200 units (OSCAL WITH D) 500-200 MG-UNIT per tablet  gabapentin (NEURONTIN) 300 MG capsule  insulin detemir (LEVEMIR PEN) 100 UNIT/ML pen  Lidocaine (LIDOCARE) 4 % Patch  lisinopril (ZESTRIL) 5 MG tablet  metFORMIN (GLUCOPHAGE-XR) 750 MG 24 hr tablet  OMEPRAZOLE PO  ondansetron (ZOFRAN ODT) 4 MG ODT tab  oxyCODONE (ROXICODONE) 5 MG tablet  semaglutide (OZEMPIC) 2 MG/1.5ML pen  VITAMIN D, CHOLECALCIFEROL, PO      No Known Allergies  Family History  No family history on file.  Social History   Social History     Tobacco Use    Smoking status: Every Day     Current packs/day: 0.25     Types: Cigarettes   Substance Use Topics     Alcohol use: No    Drug use: No      A medically appropriate review of systems was performed with pertinent positives and negatives noted in the HPI, and all other systems negative.    Physical Exam   BP: 107/74  Pulse: 87  Temp: 98.1  F (36.7  C)  Resp: 17  SpO2: 97 %  Physical Exam  General: awake, alert, NAD  Head: normal cephalic  HEENT: pupils equal, conjugate gaze intact  Neck: Supple  CV: regular rate and rhythm without murmur  Lungs: clear to auscultation  Abd: soft, right lower quadrant tenderness on exam, remainder the abdomen is non-tender, no guarding, no peritoneal signs   back: No flank tenderness bilaterally  EXT: lower extremities without swelling or edema  Neuro: awake, answers questions appropriately. No focal deficits noted       ED Course, Procedures, & Data      Procedures         Results for orders placed or performed during the hospital encounter of 03/16/25   CT Abdomen Pelvis w Contrast     Status: None    Narrative    EXAM: CT ABDOMEN PELVIS W CONTRAST  LOCATION: [facility]  DATE: 3/16/2025    INDICATION: RLQ pain x 3 days  COMPARISON: 5/9/2024  TECHNIQUE: CT scan of the abdomen and pelvis was performed following injection of IV contrast. Multiplanar reformats were obtained. Dose reduction techniques were used.  CONTRAST: 77 mL Isovue 370    FINDINGS:   LOWER CHEST: Mild bibasilar atelectasis. Advanced coronary artery calcifications.    HEPATOBILIARY: No biliary dilatation.    PANCREAS: Unremarkable    SPLEEN: Unremarkable    ADRENAL GLANDS: Unremarkable    KIDNEYS/BLADDER: No hydronephrosis. Bladder is thick-walled but decompressed. Multiple pelvic phleboliths.    BOWEL: Normal caliber appendix. No small bowel obstruction.    LYMPH NODES: No significant retroperitoneal adenopathy.    VASCULATURE: No abdominal aortic aneurysm. Patent portal vein.    PELVIC ORGANS: No free fluid    MUSCULOSKELETAL: Postoperative changes in the lumbar spine.       Impression    IMPRESSION:   1.  No CT evidence of appendicitis.  2.  Bladder is thick-walled although not well-distended. Correlate clinically for cystitis.   UA with Microscopic reflex to Culture     Status: Abnormal    Specimen: Urine, Midstream   Result Value Ref Range    Color Urine Orange (A) Colorless, Straw, Light Yellow, Yellow    Appearance Urine Clear Clear    Glucose Urine      Bilirubin Urine      Ketones Urine      Specific Gravity Urine      Blood Urine      pH Urine      Protein Albumin Urine      Urobilinogen Urine      Nitrite Urine      Leukocyte Esterase Urine      Bacteria Urine Moderate (A) None Seen /HPF    Mucus Urine Present (A) None Seen /LPF    RBC Urine 1 <=2 /HPF    WBC Urine 23 (H) <=5 /HPF    Squamous Epithelials Urine 13 (H) <=1 /HPF    Narrative    Urine Culture ordered based on laboratory criteria   Basic metabolic panel     Status: Normal   Result Value Ref Range    Sodium 138 135 - 145 mmol/L    Potassium 4.6 3.4 - 5.3 mmol/L    Chloride 102 98 - 107 mmol/L    Carbon Dioxide (CO2) 24 22 - 29 mmol/L    Anion Gap 12 7 - 15 mmol/L    Urea Nitrogen 16.2 6.0 - 20.0 mg/dL    Creatinine 0.89 0.51 - 0.95 mg/dL    GFR Estimate 76 >60 mL/min/1.73m2    Calcium 9.5 8.8 - 10.4 mg/dL    Glucose 97 70 - 99 mg/dL   CBC with platelets and differential     Status: Abnormal   Result Value Ref Range    WBC Count 8.1 4.0 - 11.0 10e3/uL    RBC Count 4.07 3.80 - 5.20 10e6/uL    Hemoglobin 11.0 (L) 11.7 - 15.7 g/dL    Hematocrit 34.0 (L) 35.0 - 47.0 %    MCV 84 78 - 100 fL    MCH 27.0 26.5 - 33.0 pg    MCHC 32.4 31.5 - 36.5 g/dL    RDW 14.8 10.0 - 15.0 %    Platelet Count 246 150 - 450 10e3/uL    % Neutrophils 64 %    % Lymphocytes 25 %    % Monocytes 8 %    % Eosinophils 2 %    % Basophils 1 %    % Immature Granulocytes 0 %    NRBCs per 100 WBC 0 <1 /100    Absolute Neutrophils 5.2 1.6 - 8.3 10e3/uL    Absolute Lymphocytes 2.0 0.8 - 5.3 10e3/uL    Absolute Monocytes 0.7 0.0 - 1.3 10e3/uL    Absolute  Eosinophils 0.2 0.0 - 0.7 10e3/uL    Absolute Basophils 0.1 0.0 - 0.2 10e3/uL    Absolute Immature Granulocytes 0.0 <=0.4 10e3/uL    Absolute NRBCs 0.0 10e3/uL   HCG qualitative urine     Status: Normal   Result Value Ref Range    hCG Urine Qualitative Negative Negative   Urine Culture     Status: Abnormal    Specimen: Urine, Midstream   Result Value Ref Range    Culture 10,000-50,000 CFU/mL Escherichia coli (A)        Susceptibility    Escherichia coli - AGUILA     Ampicillin  Resistant ug/mL     Ampicillin/ Sulbactam  Resistant ug/mL     Piperacillin/Tazobactam  Susceptible ug/mL     Cefazolin  Susceptible ug/mL     Ceftazidime  Susceptible ug/mL     Ceftriaxone  Susceptible ug/mL     Cefepime  Susceptible ug/mL     Gentamicin  Susceptible ug/mL     Ciprofloxacin  Intermediate ug/mL     Levofloxacin  Susceptible ug/mL     Nitrofurantoin  Susceptible ug/mL     Trimethoprim/Sulfamethoxazole  Resistant ug/mL   CBC with platelets differential     Status: Abnormal    Narrative    The following orders were created for panel order CBC with platelets differential.  Procedure                               Abnormality         Status                     ---------                               -----------         ------                     CBC with platelets and ...[[id number]]  Abnormal            Final result                 Please view results for these tests on the individual orders.     Medications   iopamidol (ISOVUE-370) solution 100 mL (77 mLs Intravenous $Given 3/16/25 1827)   sodium chloride 0.9 % bag 100mL for CT scan flush use (59 mLs Intravenous $Given 3/16/25 1827)   nitroFURantoin macrocrystal-monohydrate (MACROBID) capsule 100 mg (100 mg Oral $Given 3/16/25 2028)     Labs Ordered and Resulted from Time of ED Arrival to Time of ED Departure   ROUTINE UA WITH MICROSCOPIC REFLEX TO CULTURE - Abnormal       Result Value    Color Urine Orange (*)     Appearance Urine Clear      Glucose Urine        Bilirubin Urine         Ketones Urine        Specific Gravity Urine        Blood Urine        pH Urine        Protein Albumin Urine        Urobilinogen Urine        Nitrite Urine        Leukocyte Esterase Urine        Bacteria Urine Moderate (*)     Mucus Urine Present (*)     RBC Urine 1      WBC Urine 23 (*)     Squamous Epithelials Urine 13 (*)    CBC WITH PLATELETS AND DIFFERENTIAL - Abnormal    WBC Count 8.1      RBC Count 4.07      Hemoglobin 11.0 (*)     Hematocrit 34.0 (*)     MCV 84      MCH 27.0      MCHC 32.4      RDW 14.8      Platelet Count 246      % Neutrophils 64      % Lymphocytes 25      % Monocytes 8      % Eosinophils 2      % Basophils 1      % Immature Granulocytes 0      NRBCs per 100 WBC 0      Absolute Neutrophils 5.2      Absolute Lymphocytes 2.0      Absolute Monocytes 0.7      Absolute Eosinophils 0.2      Absolute Basophils 0.1      Absolute Immature Granulocytes 0.0      Absolute NRBCs 0.0     BASIC METABOLIC PANEL - Normal    Sodium 138      Potassium 4.6      Chloride 102      Carbon Dioxide (CO2) 24      Anion Gap 12      Urea Nitrogen 16.2      Creatinine 0.89      GFR Estimate 76      Calcium 9.5      Glucose 97     HCG QUALITATIVE URINE - Normal    hCG Urine Qualitative Negative       CT Abdomen Pelvis w Contrast   Final Result   IMPRESSION:    1.  No CT evidence of appendicitis.   2.  Bladder is thick-walled although not well-distended. Correlate clinically for cystitis.             Critical care was not performed.     Medical Decision Making  The patient's presentation was of moderate complexity (an acute illness with systemic symptoms).    The patient's evaluation involved:  review of external note(s) from 1 sources (see separate area of note for details)  review of 1 test result(s) ordered prior to this encounter (see separate area of note for details)  ordering and/or review of 3+ test(s) in this encounter (see separate area of note for details)    The patient's management necessitated moderate  risk (prescription drug management including medications given in the ED) and further care after sign-out to Faisal Craft (see their note for further management).    Assessment & Plan    On exam she is well-appearing, nontoxic, no flank pain fever or abnormal vital signs however she does have right lower quadrant tenderness on exam.  UA has possible signs of infection though difficult to interpret given the Pyridium.  Given right lower quadrant tenderness on exam could also consider things like appendicitis as a cause of her symptoms.  Plan is to do laboratory studies, CT imaging and if workup is negative for appendicitis would discharge home with antibiotics for UTI.  Labs unremarkable.  CT imaging pending.  Patient was signed out to Dr. Marin who will follow-up on CT results.  If negative will treat with nitrofurantoin given results of previous cultures per    I have reviewed the nursing notes. I have reviewed the findings, diagnosis, plan and need for follow up with the patient.    Discharge Medication List as of 3/16/2025  8:24 PM        START taking these medications    Details   nitroFURantoin macrocrystal-monohydrate (MACROBID) 100 MG capsule Take 1 capsule (100 mg) by mouth 2 times daily for 5 days., Disp-10 capsule, R-0, Local [employer]                   [facility] EMERGENCY DEPARTMENT  3/16/2025     Mick Chang MD  03/19/25 0962

## 2025-03-16 NOTE — DISCHARGE INSTRUCTIONS
Take your antibiotic as prescribed.  If you develop flank pain, fever, vomiting then you will need a stronger/different antibiotic.  Would recommend you return to the ER in in that event

## 2025-03-17 LAB — BACTERIA UR CULT: ABNORMAL

## 2025-03-18 ENCOUNTER — TELEPHONE (OUTPATIENT)
Dept: NURSING | Facility: CLINIC | Age: 55
End: 2025-03-18
Payer: COMMERCIAL

## 2025-03-18 NOTE — TELEPHONE ENCOUNTER
[facility] ([facility])    Reason for call: Lab Result Notification     Lab Result (including Rx patient on, if applicable).  If culture, copy of lab report at bottom.  Lab Result: Urine Culture  3/16/25 Prescribed NitroFURantoin macrocrystal-monohydrate (MACROBID) 100 MG capsule Take 1 capsule (100 mg) by mouth 2 times daily for 5 days. - Oral (SUSCEPTIBLE)    Creatinine Level (mg/dl)   Creatinine   Date Value Ref Range Status   03/16/2025 0.89 0.51 - 0.95 mg/dL Final   03/13/2016 0.52 0.52 - 1.04 mg/dL Final    Creatinine clearance (ml/min), if applicable    Serum creatinine: 0.89 mg/dL 03/16/25 1606  Estimated creatinine clearance: 64.4 mL/min     With   RN Recommendations/Instructions per [facility] ED lab result protocol:   [facility] ED lab result protocol utilized: Urine Culture    3/16/25 Presented to ED with symptoms: pain with urination for the past 3 days.     Unable to reach patient/caregiver.   Left voicemail message requesting a call back to [phone] between 9 a.m. and 5:30 p.m. for patient's ED/UC lab results.  Letter pended to be sent via [employer] mail.        Josephine Garzon RN

## 2025-03-18 NOTE — LETTER
March 18, 2025        Sandy Kline  [address]  [place] [address]          Dear Sandy Kline:    You were seen in the [facility] Emergency Department [facility]16/2025.  We are unable to reach you by phone, so we are sending you this letter.     It is important that you call [facility] Emergency Department lab result nurse at [phone], as we have information to relay to you AND/OR we MAY have to make some changes in your treatment.    Best time to call back is between 9AM and 5:30PM, 7 days a week.      Sincerely,     [facility] Emergency Department Lab Result RN  [phone]

## 2025-04-06 ENCOUNTER — APPOINTMENT (OUTPATIENT)
Dept: GENERAL RADIOLOGY | Facility: CLINIC | Age: 55
End: 2025-04-06
Attending: EMERGENCY MEDICINE
Payer: COMMERCIAL

## 2025-04-06 ENCOUNTER — HOSPITAL ENCOUNTER (EMERGENCY)
Facility: CLINIC | Age: 55
Discharge: HOME | End: 2025-04-06
Attending: EMERGENCY MEDICINE | Admitting: EMERGENCY MEDICINE
Payer: COMMERCIAL

## 2025-04-06 VITALS
DIASTOLIC BLOOD PRESSURE: 63 MMHG | RESPIRATION RATE: 16 BRPM | HEART RATE: 89 BPM | OXYGEN SATURATION: 99 % | HEIGHT: 61 IN | SYSTOLIC BLOOD PRESSURE: 107 MMHG | BODY MASS INDEX: 29.27 KG/M2 | WEIGHT: 155 LBS | TEMPERATURE: 97.8 F

## 2025-04-06 DIAGNOSIS — R10.9 RIGHT FLANK PAIN: ICD-10-CM

## 2025-04-06 LAB
ALBUMIN SERPL BCG-MCNC: 4.1 G/DL (ref 3.5–5.2)
ALBUMIN UR-MCNC: NEGATIVE MG/DL
ALP SERPL-CCNC: 105 U/L (ref 40–150)
ALT SERPL W P-5'-P-CCNC: 13 U/L (ref 0–50)
ANION GAP SERPL CALCULATED.3IONS-SCNC: 13 MMOL/L (ref 7–15)
APPEARANCE UR: CLEAR
AST SERPL W P-5'-P-CCNC: 19 U/L (ref 0–45)
BASOPHILS # BLD AUTO: 0 10E3/UL (ref 0–0.2)
BASOPHILS NFR BLD AUTO: 1 %
BILIRUB SERPL-MCNC: 0.3 MG/DL
BILIRUB UR QL STRIP: NEGATIVE
BUN SERPL-MCNC: 20.7 MG/DL (ref 6–20)
CALCIUM SERPL-MCNC: 9.5 MG/DL (ref 8.8–10.4)
CHLORIDE SERPL-SCNC: 104 MMOL/L (ref 98–107)
COLOR UR AUTO: YELLOW
CREAT SERPL-MCNC: 0.82 MG/DL (ref 0.51–0.95)
EGFRCR SERPLBLD CKD-EPI 2021: 83 ML/MIN/1.73M2
EOSINOPHIL # BLD AUTO: 0.1 10E3/UL (ref 0–0.7)
EOSINOPHIL NFR BLD AUTO: 2 %
ERYTHROCYTE [DISTWIDTH] IN BLOOD BY AUTOMATED COUNT: 15.4 % (ref 10–15)
GLUCOSE SERPL-MCNC: 86 MG/DL (ref 70–99)
GLUCOSE UR STRIP-MCNC: NEGATIVE MG/DL
HCO3 SERPL-SCNC: 23 MMOL/L (ref 22–29)
HCT VFR BLD AUTO: 35 % (ref 35–47)
HGB BLD-MCNC: 11.4 G/DL (ref 11.7–15.7)
HGB UR QL STRIP: NEGATIVE
IMM GRANULOCYTES # BLD: 0 10E3/UL
IMM GRANULOCYTES NFR BLD: 0 %
KETONES UR STRIP-MCNC: NEGATIVE MG/DL
LEUKOCYTE ESTERASE UR QL STRIP: NEGATIVE
LIPASE SERPL-CCNC: 49 U/L (ref 13–60)
LYMPHOCYTES # BLD AUTO: 1.5 10E3/UL (ref 0.8–5.3)
LYMPHOCYTES NFR BLD AUTO: 21 %
MCH RBC QN AUTO: 27.5 PG (ref 26.5–33)
MCHC RBC AUTO-ENTMCNC: 32.6 G/DL (ref 31.5–36.5)
MCV RBC AUTO: 84 FL (ref 78–100)
MONOCYTES # BLD AUTO: 0.7 10E3/UL (ref 0–1.3)
MONOCYTES NFR BLD AUTO: 10 %
NEUTROPHILS # BLD AUTO: 4.7 10E3/UL (ref 1.6–8.3)
NEUTROPHILS NFR BLD AUTO: 67 %
NITRATE UR QL: NEGATIVE
NRBC # BLD AUTO: 0 10E3/UL
NRBC BLD AUTO-RTO: 0 /100
PH UR STRIP: 5.5 [PH] (ref 5–7)
PLATELET # BLD AUTO: 203 10E3/UL (ref 150–450)
POTASSIUM SERPL-SCNC: 4.3 MMOL/L (ref 3.4–5.3)
PROT SERPL-MCNC: 7.6 G/DL (ref 6.4–8.3)
RBC # BLD AUTO: 4.15 10E6/UL (ref 3.8–5.2)
RBC URINE: 0 /HPF
SODIUM SERPL-SCNC: 140 MMOL/L (ref 135–145)
SP GR UR STRIP: 1.03 (ref 1–1.03)
SQUAMOUS EPITHELIAL: 2 /HPF
UROBILINOGEN UR STRIP-MCNC: NORMAL MG/DL
WBC # BLD AUTO: 7.1 10E3/UL (ref 4–11)
WBC URINE: 1 /HPF

## 2025-04-06 PROCEDURE — 83690 ASSAY OF LIPASE: CPT | Performed by: EMERGENCY MEDICINE

## 2025-04-06 PROCEDURE — 81003 URINALYSIS AUTO W/O SCOPE: CPT | Performed by: EMERGENCY MEDICINE

## 2025-04-06 PROCEDURE — 36415 COLL VENOUS BLD VENIPUNCTURE: CPT | Performed by: EMERGENCY MEDICINE

## 2025-04-06 PROCEDURE — 96374 THER/PROPH/DIAG INJ IV PUSH: CPT | Performed by: EMERGENCY MEDICINE

## 2025-04-06 PROCEDURE — 99284 EMERGENCY DEPT VISIT MOD MDM: CPT | Performed by: EMERGENCY MEDICINE

## 2025-04-06 PROCEDURE — 71046 X-RAY EXAM CHEST 2 VIEWS: CPT

## 2025-04-06 PROCEDURE — 99284 EMERGENCY DEPT VISIT MOD MDM: CPT | Mod: 25 | Performed by: EMERGENCY MEDICINE

## 2025-04-06 PROCEDURE — 80053 COMPREHEN METABOLIC PANEL: CPT | Performed by: EMERGENCY MEDICINE

## 2025-04-06 PROCEDURE — 85025 COMPLETE CBC W/AUTO DIFF WBC: CPT | Performed by: EMERGENCY MEDICINE

## 2025-04-06 PROCEDURE — 250N000013 HC RX MED GY IP 250 OP 250 PS 637: Performed by: EMERGENCY MEDICINE

## 2025-04-06 PROCEDURE — 250N000011 HC RX IP 250 OP 636: Mod: JZ | Performed by: EMERGENCY MEDICINE

## 2025-04-06 RX ORDER — ACYCLOVIR 400 MG/1
1 TABLET ORAL
COMMUNITY
Start: 2025-03-20

## 2025-04-06 RX ORDER — CYCLOBENZAPRINE HCL 10 MG
10 TABLET ORAL 3 TIMES DAILY PRN
Qty: 20 TABLET | Refills: 0 | Status: SHIPPED | OUTPATIENT
Start: 2025-04-06 | End: 2025-04-12

## 2025-04-06 RX ORDER — ACETAMINOPHEN 500 MG
1000 TABLET ORAL ONCE
Status: COMPLETED | OUTPATIENT
Start: 2025-04-06 | End: 2025-04-06

## 2025-04-06 RX ORDER — NITROFURANTOIN 25; 75 MG/1; MG/1
100 CAPSULE ORAL
COMMUNITY
Start: 2025-04-02 | End: 2025-04-07

## 2025-04-06 RX ORDER — CYCLOBENZAPRINE HCL 10 MG
10 TABLET ORAL ONCE
Status: COMPLETED | OUTPATIENT
Start: 2025-04-06 | End: 2025-04-06

## 2025-04-06 RX ORDER — KETOROLAC TROMETHAMINE 15 MG/ML
15 INJECTION, SOLUTION INTRAMUSCULAR; INTRAVENOUS ONCE
Status: COMPLETED | OUTPATIENT
Start: 2025-04-06 | End: 2025-04-06

## 2025-04-06 RX ORDER — PHENAZOPYRIDINE HYDROCHLORIDE 200 MG/1
200 TABLET, FILM COATED ORAL ONCE
Status: COMPLETED | OUTPATIENT
Start: 2025-04-06 | End: 2025-04-06

## 2025-04-06 RX ADMIN — PHENAZOPYRIDINE 200 MG: 200 TABLET ORAL at 09:36

## 2025-04-06 RX ADMIN — CYCLOBENZAPRINE 10 MG: 10 TABLET, FILM COATED ORAL at 10:33

## 2025-04-06 RX ADMIN — KETOROLAC TROMETHAMINE 15 MG: 15 INJECTION, SOLUTION INTRAMUSCULAR; INTRAVENOUS at 09:37

## 2025-04-06 RX ADMIN — ACETAMINOPHEN 1000 MG: 500 TABLET ORAL at 10:36

## 2025-04-06 ASSESSMENT — ACTIVITIES OF DAILY LIVING (ADL)
ADLS_ACUITY_SCORE: 55
ADLS_ACUITY_SCORE: 55

## 2025-04-06 NOTE — ED PROVIDER NOTES
[facility] EMERGENCY DEPARTMENT ([facility])    4/06/25       ED PROVIDER NOTE     History     Chief Complaint   Patient presents with    Flank Pain     Right sided back / flank pain that started yesterday. Recent UTI    UTI     Recent UTI, new antibiotics started Wednesday, patient reports it is still hurting when she voids     The history is provided by the patient and medical records.     Sandy Kline is a 55 year old female with a history of type II diabetes, who presents to the ED for evaluation of flank pain. The patient states that she has been seen in clinic multiple times over the last week for dysuria and lower abdominal pain and was prescribed acyclovir and nitrofurantoin for UTI. She states that she has been taking both of these but has continued to experience burning pain with urination and lower abdominal pain. She states that she also began experiencing right flank pain approximately 2 days ago, prompting her presentation to the emergency department again today. The patient denies any fever, vomiting, or difficulty with bowel movements. She notes that her urine is dark yellow but denies any hematuria. She reports treating with Tylenol.  She does not have any chest pain, shortness of breath, pain with breathing.  No leg pain or swelling.    Per chart review, patient was seen in the ED here on 3/16 for dysuria and lower abdominal pain. UA had possible signs of infection. CT at that time showed bladder was thick-walled and patient was discharged with prescription for 5 day course of Macrobid.  Patient was seen in clinic on 3/20/25 and stated that her UTI symptoms resolved but she had some ongoing dysuria. She was given refill of acyclovir. She was seen in clinic again on 4/2/25 for recurrent abdominal pain and dysuria. CT again showed thickened bladder wall and patient was given prescription for Macrobid and urology referral.     EXAM: CT ABDOMEN PELVIS W CONTRAST  DATE: 3/16/2025  IMPRESSION:  "  1.  No CT evidence of appendicitis.  2.  Bladder is thick-walled although not well-distended. Correlate clinically for cystitis.    Past Medical History  Past Medical History:   Diagnosis Date    Diabetes mellitus (H)      Past Surgical History:   Procedure Laterality Date    carpel tunnel      ORTHOPEDIC SURGERY       acyclovir (ZOVIRAX) 400 MG tablet  lisinopril (ZESTRIL) 5 MG tablet  metFORMIN (GLUCOPHAGE-XR) 750 MG 24 hr tablet  nitroFURantoin macrocrystal-monohydrate (MACROBID) 100 MG capsule  semaglutide (OZEMPIC) 2 MG/1.5ML pen  acetaminophen (TYLENOL) 325 MG tablet  ASPIRIN PO  atorvastatin (LIPITOR) 20 MG tablet  calcium carb 1250 mg, 500 mg [place],/vitamin D 200 units (OSCAL WITH D) 500-200 MG-UNIT per tablet  gabapentin (NEURONTIN) 300 MG capsule  insulin detemir (LEVEMIR PEN) 100 UNIT/ML pen  Lidocaine (LIDOCARE) 4 % Patch  OMEPRAZOLE PO  ondansetron (ZOFRAN ODT) 4 MG ODT tab  oxyCODONE (ROXICODONE) 5 MG tablet  VITAMIN D, CHOLECALCIFEROL, PO      No Known Allergies  Family History  No family history on file.  Social History   Social History     Tobacco Use    Smoking status: Every Day     Current packs/day: 0.25     Types: Cigarettes   Substance Use Topics    Alcohol use: No    Drug use: No      ROS: 14 point ROS neg other than the symptoms noted above in the HPI.     Physical Exam   BP: 124/84  Pulse: 80  Temp: 97.8  F (36.6  C)  Resp: 18  Height: 154.9 cm (5' 1\")  Weight: 70.3 kg (155 lb)  SpO2: 99 %  Physical Exam  Physical Exam   Constitutional: oriented to person, place, and time. appears well-developed and well-nourished.   HENT:   Head: Normocephalic and atraumatic.   Neck: Normal range of motion.   Pulmonary/Chest: Effort normal. No respiratory distress.   Cardiac: No murmurs, rubs, gallops. RRR.  Abdominal: Abdomen soft, nontender, nondistended. No rebound tenderness.  MSK: Patient does have tenderness to palpation of the right inferior posterior lateral ribs, no rashes overlying this area.  No " lower extremity edema.  Neurological: alert and oriented to person, place, and time.   Skin: Skin is warm and dry.   Psychiatric:  normal mood and affect.  behavior is normal. Thought content normal.         ED Course, Procedures, & Data      Procedures            Results for orders placed or performed during the hospital encounter of 04/06/25   XR Chest 2 Views     Status: None    Narrative    EXAM: XR CHEST 2 VIEWS  LOCATION: [facility]  DATE: 4/6/2025    INDICATION: R posterior rib pain  COMPARISON: September 21, 2024      Impression    IMPRESSION: Negative chest.   UA with Microscopic reflex to Culture     Status: Abnormal    Specimen: Urine, Midstream   Result Value Ref Range    Color Urine Yellow Colorless, Straw, Light Yellow, Yellow    Appearance Urine Clear Clear    Glucose Urine Negative Negative mg/dL    Bilirubin Urine Negative Negative    Ketones Urine Negative Negative mg/dL    Specific Gravity Urine 1.027 1.003 - 1.035    Blood Urine Negative Negative    pH Urine 5.5 5.0 - 7.0    Protein Albumin Urine Negative Negative mg/dL    Urobilinogen Urine Normal Normal mg/dL    Nitrite Urine Negative Negative    Leukocyte Esterase Urine Negative Negative    RBC Urine 0 <=2 /HPF    WBC Urine 1 <=5 /HPF    Squamous Epithelials Urine 2 (H) <=1 /HPF    Narrative    Urine Culture not indicated   Comprehensive metabolic panel     Status: Abnormal   Result Value Ref Range    Sodium 140 135 - 145 mmol/L    Potassium 4.3 3.4 - 5.3 mmol/L    Carbon Dioxide (CO2) 23 22 - 29 mmol/L    Anion Gap 13 7 - 15 mmol/L    Urea Nitrogen 20.7 (H) 6.0 - 20.0 mg/dL    Creatinine 0.82 0.51 - 0.95 mg/dL    GFR Estimate 83 >60 mL/min/1.73m2    Calcium 9.5 8.8 - 10.4 mg/dL    Chloride 104 98 - 107 mmol/L    Glucose 86 70 - 99 mg/dL    Alkaline Phosphatase 105 40 - 150 U/L    AST 19 0 - 45 U/L    ALT 13 0 - 50 U/L    Protein Total 7.6 6.4 - 8.3 g/dL    Albumin 4.1 3.5 - 5.2 g/dL    Bilirubin  Total 0.3 <=1.2 mg/dL   Lipase     Status: Normal   Result Value Ref Range    Lipase 49 13 - 60 U/L   CBC with platelets and differential     Status: Abnormal   Result Value Ref Range    WBC Count 7.1 4.0 - 11.0 10e3/uL    RBC Count 4.15 3.80 - 5.20 10e6/uL    Hemoglobin 11.4 (L) 11.7 - 15.7 g/dL    Hematocrit 35.0 35.0 - 47.0 %    MCV 84 78 - 100 fL    MCH 27.5 26.5 - 33.0 pg    MCHC 32.6 31.5 - 36.5 g/dL    RDW 15.4 (H) 10.0 - 15.0 %    Platelet Count 203 150 - 450 10e3/uL    % Neutrophils 67 %    % Lymphocytes 21 %    % Monocytes 10 %    % Eosinophils 2 %    % Basophils 1 %    % Immature Granulocytes 0 %    NRBCs per 100 WBC 0 <1 /100    Absolute Neutrophils 4.7 1.6 - 8.3 10e3/uL    Absolute Lymphocytes 1.5 0.8 - 5.3 10e3/uL    Absolute Monocytes 0.7 0.0 - 1.3 10e3/uL    Absolute Eosinophils 0.1 0.0 - 0.7 10e3/uL    Absolute Basophils 0.0 0.0 - 0.2 10e3/uL    Absolute Immature Granulocytes 0.0 <=0.4 10e3/uL    Absolute NRBCs 0.0 10e3/uL   CBC with platelets differential     Status: Abnormal    Narrative    The following orders were created for panel order CBC with platelets differential.  Procedure                               Abnormality         Status                     ---------                               -----------         ------                     CBC with platelets and ...[[id number]]  Abnormal            Final result                 Please view results for these tests on the individual orders.     Medications   ketorolac (TORADOL) injection 15 mg (has no administration in time range)   phenazopyridine (PYRIDIUM) tablet 200 mg (has no administration in time range)     Labs Ordered and Resulted from Time of ED Arrival to Time of ED Departure - No data to display  No orders to display          Critical care was not performed.     Medical Decision Making  The patient's presentation was of moderate complexity (an undiagnosed new problem with uncertain prognosis).    The patient's evaluation  involved:  strong consideration of a test (CT chest, CT abdomen) that was ultimately deferred  ordering and/or review of 3+ test(s) in this encounter (see separate area of note for details)    The patient's management necessitated moderate risk (prescription drug management including medications given in the ED).    Assessment & Plan    Patient here with flank pain.  She reports ongoing dysuria I did review culture, she is on appropriate antibiotic, nitrofurantoin is still taking that.  She is afebrile.  White count is normal.  Urinalysis is normal without any evidence of ongoing infection.  She is very reproducible tenderness with minimal palpation to the right posterior thoracic ribs.  Denies any falls or injuries.  There is no pain with breathing in.  She has no chest pain or shortness of breath.  Unlikely pulmonary embolism, will defer this workup.  She has had multiple presentations with flank pain in the past.  She does not have history kidney stones, there is no hematuria, do not feel imaging necessary.  Abdominal exam is quite benign and LFTs unremarkable, do not feel ultrasound or CT of the abdomen are fully necessary today.  Did discuss with the patient, use shared decision making I decided to forego any CT due to benign appearing labs and reassuring examination.  Pain is improved with some Flexeril and NSAIDs.  Chest x-ray unremarkable.  Will prescribe Flexeril and have her follow-up with her primary care provider.    I have reviewed the nursing notes. I have reviewed the findings, diagnosis, plan and need for follow up with the patient.    New Prescriptions    CYCLOBENZAPRINE (FLEXERIL) 10 MG TABLET    Take 1 tablet (10 mg) by mouth 3 times daily as needed for muscle spasms.       Final diagnoses:   Right flank pain   Jhoana SZYMANSKI, am serving as a trained medical scribe to document services personally performed by Alphonso Roland MD, based on the provider's statements to me.     Alphonso SZYMANSKI  MD Luan, was physically present and have reviewed and verified the accuracy of this note documented by Jhoana Hanson.      Alphonso Roland MD    [facility] EMERGENCY DEPARTMENT  4/6/2025     Alphonso Roland MD  04/06/25 1140

## 2025-04-06 NOTE — DISCHARGE INSTRUCTIONS
Continue to take your antibiotics until they are gone    Please call your primary care provider for a follow-up appointment as soon as you are able    If you develop shortness of breath, chest pain, fevers or worsening symptoms return to the emergency department.    If Sandy has discomfort from fever or other pain, she can have:  Acetaminophen (Tylenol) every 6 hours as needed. Her dose is:    2 regular strength tabs (650 mg)                                     (43.2+ kg/96+ lb)    NOTE: If your acetaminophen (Tylenol) came with a dropper marked with 0.4 and 0.8 ml, call us ([phone]) or check with your doctor about the dose before using it.     AND/OR      Ibuprofen (Advil, Motrin) every 6 hours as needed. His/her dose is:    3 regular strength tabs (600 mg)                                                                         (60-80 kg/132-176 lb)

## 2025-04-06 NOTE — ED TRIAGE NOTES
Patient reports having right sided flank pain / back pain that started yesterday. Patient reports having a UTI recently. Patient was prescribed Macrobid four days ago. Patient reports her urine is a dark color with an unusual smell. Patient reports she has been taking the medication but it still hurts when she urinates. Patient endorses dysuria and urinary frequency. Patient reports the back pain is unbearable.

## 2025-06-23 ENCOUNTER — APPOINTMENT (OUTPATIENT)
Dept: CT IMAGING | Facility: CLINIC | Age: 55
End: 2025-06-23
Attending: EMERGENCY MEDICINE
Payer: COMMERCIAL

## 2025-06-23 ENCOUNTER — APPOINTMENT (OUTPATIENT)
Dept: GENERAL RADIOLOGY | Facility: CLINIC | Age: 55
End: 2025-06-23
Attending: EMERGENCY MEDICINE
Payer: COMMERCIAL

## 2025-06-23 ENCOUNTER — HOSPITAL ENCOUNTER (EMERGENCY)
Facility: CLINIC | Age: 55
Discharge: HOME | End: 2025-06-24
Attending: EMERGENCY MEDICINE
Payer: COMMERCIAL

## 2025-06-23 VITALS
HEIGHT: 62 IN | RESPIRATION RATE: 20 BRPM | DIASTOLIC BLOOD PRESSURE: 90 MMHG | HEART RATE: 92 BPM | OXYGEN SATURATION: 99 % | SYSTOLIC BLOOD PRESSURE: 132 MMHG | BODY MASS INDEX: 28.52 KG/M2 | TEMPERATURE: 98.1 F | WEIGHT: 155 LBS

## 2025-06-23 DIAGNOSIS — N39.0 URINARY TRACT INFECTION WITHOUT HEMATURIA, SITE UNSPECIFIED: ICD-10-CM

## 2025-06-23 DIAGNOSIS — R10.10 PAIN OF UPPER ABDOMEN: ICD-10-CM

## 2025-06-23 DIAGNOSIS — R07.9 CHEST PAIN, UNSPECIFIED TYPE: ICD-10-CM

## 2025-06-23 LAB
ALBUMIN SERPL BCG-MCNC: 4.4 G/DL (ref 3.5–5.2)
ALP SERPL-CCNC: 97 U/L (ref 40–150)
ALT SERPL W P-5'-P-CCNC: 17 U/L (ref 0–50)
ANION GAP SERPL CALCULATED.3IONS-SCNC: 15 MMOL/L (ref 7–15)
AST SERPL W P-5'-P-CCNC: 24 U/L (ref 0–45)
BASOPHILS # BLD AUTO: 0 10E3/UL (ref 0–0.2)
BASOPHILS NFR BLD AUTO: 0 %
BILIRUB SERPL-MCNC: 0.3 MG/DL
BUN SERPL-MCNC: 16.3 MG/DL (ref 6–20)
CALCIUM SERPL-MCNC: 9.1 MG/DL (ref 8.8–10.4)
CHLORIDE SERPL-SCNC: 103 MMOL/L (ref 98–107)
CREAT SERPL-MCNC: 1 MG/DL (ref 0.51–0.95)
D DIMER PPP FEU-MCNC: 0.27 UG/ML FEU (ref 0–0.5)
EGFRCR SERPLBLD CKD-EPI 2021: 65 ML/MIN/1.73M2
EOSINOPHIL # BLD AUTO: 0 10E3/UL (ref 0–0.7)
EOSINOPHIL NFR BLD AUTO: 0 %
ERYTHROCYTE [DISTWIDTH] IN BLOOD BY AUTOMATED COUNT: 14.9 % (ref 10–15)
GLUCOSE SERPL-MCNC: 128 MG/DL (ref 70–99)
HCO3 SERPL-SCNC: 21 MMOL/L (ref 22–29)
HCT VFR BLD AUTO: 33.9 % (ref 35–47)
HGB BLD-MCNC: 11 G/DL (ref 11.7–15.7)
IMM GRANULOCYTES # BLD: 0 10E3/UL
IMM GRANULOCYTES NFR BLD: 0 %
LIPASE SERPL-CCNC: 33 U/L (ref 13–60)
LYMPHOCYTES # BLD AUTO: 1.6 10E3/UL (ref 0.8–5.3)
LYMPHOCYTES NFR BLD AUTO: 21 %
MAGNESIUM SERPL-MCNC: 1.4 MG/DL (ref 1.7–2.3)
MCH RBC QN AUTO: 26.5 PG (ref 26.5–33)
MCHC RBC AUTO-ENTMCNC: 32.4 G/DL (ref 31.5–36.5)
MCV RBC AUTO: 82 FL (ref 78–100)
MONOCYTES # BLD AUTO: 0.5 10E3/UL (ref 0–1.3)
MONOCYTES NFR BLD AUTO: 6 %
NEUTROPHILS # BLD AUTO: 5.3 10E3/UL (ref 1.6–8.3)
NEUTROPHILS NFR BLD AUTO: 72 %
NRBC # BLD AUTO: 0 10E3/UL
NRBC BLD AUTO-RTO: 0 /100
PLATELET # BLD AUTO: 226 10E3/UL (ref 150–450)
POTASSIUM SERPL-SCNC: 4.1 MMOL/L (ref 3.4–5.3)
PROT SERPL-MCNC: 7.4 G/DL (ref 6.4–8.3)
RBC # BLD AUTO: 4.15 10E6/UL (ref 3.8–5.2)
SODIUM SERPL-SCNC: 139 MMOL/L (ref 135–145)
TROPONIN T SERPL HS-MCNC: 10 NG/L
WBC # BLD AUTO: 7.4 10E3/UL (ref 4–11)

## 2025-06-23 PROCEDURE — 99285 EMERGENCY DEPT VISIT HI MDM: CPT | Mod: 25 | Performed by: EMERGENCY MEDICINE

## 2025-06-23 PROCEDURE — 85379 FIBRIN DEGRADATION QUANT: CPT | Performed by: EMERGENCY MEDICINE

## 2025-06-23 PROCEDURE — 71046 X-RAY EXAM CHEST 2 VIEWS: CPT

## 2025-06-23 PROCEDURE — 99284 EMERGENCY DEPT VISIT MOD MDM: CPT | Performed by: EMERGENCY MEDICINE

## 2025-06-23 PROCEDURE — 84484 ASSAY OF TROPONIN QUANT: CPT | Performed by: EMERGENCY MEDICINE

## 2025-06-23 PROCEDURE — 96365 THER/PROPH/DIAG IV INF INIT: CPT | Performed by: EMERGENCY MEDICINE

## 2025-06-23 PROCEDURE — 36415 COLL VENOUS BLD VENIPUNCTURE: CPT | Performed by: EMERGENCY MEDICINE

## 2025-06-23 PROCEDURE — 74177 CT ABD & PELVIS W/CONTRAST: CPT

## 2025-06-23 PROCEDURE — 83690 ASSAY OF LIPASE: CPT | Performed by: EMERGENCY MEDICINE

## 2025-06-23 PROCEDURE — 250N000009 HC RX 250: Performed by: EMERGENCY MEDICINE

## 2025-06-23 PROCEDURE — 85048 AUTOMATED LEUKOCYTE COUNT: CPT | Performed by: EMERGENCY MEDICINE

## 2025-06-23 PROCEDURE — 250N000011 HC RX IP 250 OP 636: Performed by: EMERGENCY MEDICINE

## 2025-06-23 PROCEDURE — 83735 ASSAY OF MAGNESIUM: CPT | Performed by: EMERGENCY MEDICINE

## 2025-06-23 PROCEDURE — 93005 ELECTROCARDIOGRAM TRACING: CPT | Mod: RTG

## 2025-06-23 PROCEDURE — 84075 ASSAY ALKALINE PHOSPHATASE: CPT | Performed by: EMERGENCY MEDICINE

## 2025-06-23 PROCEDURE — 250N000013 HC RX MED GY IP 250 OP 250 PS 637: Performed by: EMERGENCY MEDICINE

## 2025-06-23 PROCEDURE — 258N000003 HC RX IP 258 OP 636: Performed by: EMERGENCY MEDICINE

## 2025-06-23 RX ORDER — IOPAMIDOL 755 MG/ML
500 INJECTION, SOLUTION INTRAVASCULAR ONCE
Status: COMPLETED | OUTPATIENT
Start: 2025-06-23 | End: 2025-06-23

## 2025-06-23 RX ORDER — SODIUM CHLORIDE 9 MG/ML
INJECTION, SOLUTION INTRAVENOUS
Status: COMPLETED
Start: 2025-06-23 | End: 2025-06-24

## 2025-06-23 RX ORDER — MAGNESIUM SULFATE HEPTAHYDRATE 40 MG/ML
2 INJECTION, SOLUTION INTRAVENOUS ONCE
Status: COMPLETED | OUTPATIENT
Start: 2025-06-23 | End: 2025-06-23

## 2025-06-23 RX ORDER — ACETAMINOPHEN 500 MG
1000 TABLET ORAL ONCE
Status: COMPLETED | OUTPATIENT
Start: 2025-06-23 | End: 2025-06-23

## 2025-06-23 RX ORDER — SULFAMETHOXAZOLE AND TRIMETHOPRIM 800; 160 MG/1; MG/1
1 TABLET ORAL ONCE
Status: COMPLETED | OUTPATIENT
Start: 2025-06-23 | End: 2025-06-24

## 2025-06-23 RX ORDER — HYDROXYZINE HYDROCHLORIDE 50 MG/1
50 TABLET, FILM COATED ORAL ONCE
Status: COMPLETED | OUTPATIENT
Start: 2025-06-23 | End: 2025-06-23

## 2025-06-23 RX ORDER — CIPROFLOXACIN 500 MG/1
500 TABLET, FILM COATED ORAL 2 TIMES DAILY
Qty: 6 TABLET | Refills: 0 | Status: SHIPPED | OUTPATIENT
Start: 2025-06-23 | End: 2025-06-24

## 2025-06-23 RX ADMIN — SODIUM CHLORIDE 500 ML: 0.9 INJECTION, SOLUTION INTRAVENOUS at 19:27

## 2025-06-23 RX ADMIN — ACETAMINOPHEN 1000 MG: 500 TABLET ORAL at 20:39

## 2025-06-23 RX ADMIN — SODIUM CHLORIDE 75 ML: 9 INJECTION, SOLUTION INTRAVENOUS at 22:12

## 2025-06-23 RX ADMIN — MAGNESIUM SULFATE HEPTAHYDRATE 2 G: 40 INJECTION, SOLUTION INTRAVENOUS at 19:27

## 2025-06-23 RX ADMIN — IOPAMIDOL 76 ML: 755 INJECTION, SOLUTION INTRAVENOUS at 22:11

## 2025-06-23 RX ADMIN — HYDROXYZINE HYDROCHLORIDE 50 MG: 50 TABLET ORAL at 18:14

## 2025-06-23 ASSESSMENT — ACTIVITIES OF DAILY LIVING (ADL)
ADLS_ACUITY_SCORE: 55

## 2025-06-23 NOTE — ED TRIAGE NOTES
" utilized via [id number]    Pt here for shortness of breathe.    Pt shares she saw a provider 2 weeks ago for SOB.    Pt was prescribed an allergy nasal spray and medication to treat possibly allergies.  Symptoms did not help  Pt went back to see the doctor again and was then advised to stop taking her blood pressure medication.    Symptoms still have not improved.  Symptoms sometimes feel worse as she feels like she is working harder to catch her breathe.    Pt describes her breathing as a knot to the stomach.  Pt has to yawn to try to get a full breathe in.    Pt also notes inflammation to the stomach as well.  Feels like a \"bubble.\"  Currently abdominal pain is 6/10.          "

## 2025-06-23 NOTE — ED PROVIDER NOTES
[facility] EMERGENCY DEPARTMENT ([facility])    6/23/25      ED PROVIDER NOTE     History   No chief complaint on file.    HPI  Sandy Kline is a 55 year old female with history of type 2 diabetes, iron deficiency anemia who presents to the emergency department with shortness of breath. Patient reports that she has been feeling tired and is having a hard time breathing x2 weeks. She saw her doctor 2 weeks ago who told her it was probably due to seasonal allergies, and was given cetirizine and Flonase. She states that the Flonase didn't help, and saw her doctor again a week ago. She endorses dizziness. She also notes that she has a constant sensation that she needs to yawn, but is not able to. Patient states that she feels like something is stuck in her throat and stomach, and is causing some stomach pain. She gets very tired just from trying to breath, and that she is having trouble just breathing through her nose. She denies any cardiac history. Denies current chest pain. She did feel a sharp pain on the right side of her chest 1 month ago, but it went away quickly. She has T2DM, but states that it is under control. Her doctor did blood work and an EKG, and everything came back unremarkable. Denies nausea, vomiting, diarrhea. No fever. No chills. She has some occasional numbness in her hands, but she states that it goes away quickly and has not experienced this numbness in a few months. Patient also notes that she is not feeling very stressed right now.     Past Medical History  Past Medical History:   Diagnosis Date    Diabetes mellitus (H)      Past Surgical History:   Procedure Laterality Date    carpel tunnel      ORTHOPEDIC SURGERY       acetaminophen (TYLENOL) 325 MG tablet  acyclovir (ZOVIRAX) 400 MG tablet  ASPIRIN PO  atorvastatin (LIPITOR) 20 MG tablet  calcium carb 1250 mg, 500 mg [place],/vitamin D 200 units (OSCAL WITH D) 500-200 MG-UNIT per tablet  gabapentin (NEURONTIN) 300 MG capsule  insulin  "detemir (LEVEMIR PEN) 100 UNIT/ML pen  Lidocaine (LIDOCARE) 4 % Patch  lisinopril (ZESTRIL) 5 MG tablet  metFORMIN (GLUCOPHAGE-XR) 750 MG 24 hr tablet  OMEPRAZOLE PO  ondansetron (ZOFRAN ODT) 4 MG ODT tab  oxyCODONE (ROXICODONE) 5 MG tablet  semaglutide (OZEMPIC) 2 MG/1.5ML pen  VITAMIN D, CHOLECALCIFEROL, PO      No Known Allergies  Family History  History reviewed. No pertinent family history.  Social History   Social History     Tobacco Use    Smoking status: Every Day     Current packs/day: 0.25     Types: Cigarettes   Substance Use Topics    Alcohol use: No    Drug use: No      A medically appropriate review of systems was performed with pertinent positives and negatives noted in the HPI, and all other systems negative.    Physical Exam      Physical Exam  ***    ED Course, Procedures, & Data      Procedures       {ED Course Selections (Optional):527803}  {ED Sepsis CMS Documentation (Optional):134289::\" \"}          Medications - No data to display       {Critical Care Performed?:944868}    Assessment & Plan    ***    I have reviewed the nursing notes. I have reviewed the findings, diagnosis, plan and need for follow up with the patient.    New Prescriptions    No medications on file       Final diagnoses:   None   IMega, am serving as a trained medical scribe to document services personally performed by Urszula Cummings MD, based on the provider's statements to me.     I, Urszula Cummings MD, was physically present and have reviewed and verified the accuracy of this note documented by Mega Pagan.     ***  [facility] EMERGENCY DEPARTMENT  6/23/2025  " moderate risk with prescription of medications administered and prescribed.    Assessment & Plan      55 year old female with history of type 2 diabetes, iron deficiency anemia who presents to the emergency department with shortness of breath.  Vital stable afebrile include normal pulse ox at 99% on room air.  EKG obtained which revealed normal sinus rhythm without acute schema changes.  IV established, labs sent which revealed normal CBC, magnesium low at 1.4 but otherwise normal electrolytes.  D-dimer and lipase and troponin all normal.  She was sent to chest x-ray which was interpreted underbelly by me and revealed no acute process.  Patient was also sent to CT for imaging of the chest abdomen pelvis which revealed bladder inflammation consistent with cystitis.  Urinalysis was ordered however specimen was not provided.  Patient was given magnesium sulfate 2 g IV piggyback along with a liter normal saline IV fluid bolus.  She was given a dose of Bactrim for her.  Treatment of urinary tract infection, also admits.  Hydroxyzine along with acetaminophen 1 g p.o.  Upon repeat assessment patient's symptoms had improved and she was discharged home with plan to follow-up with her primary care provider.      I have reviewed the nursing notes. I have reviewed the findings, diagnosis, plan and need for follow up with the patient.    New Prescriptions    No medications on file       Final diagnoses:   Chest pain, unspecified type   Pain of upper abdomen   Urinary tract infection without hematuria, site unspecified   IMega, am serving as a trained medical scribe to document services personally performed by Urszula Cummings MD, based on the provider's statements to me.     IUrszula MD, was physically present and have reviewed and verified the accuracy of this note documented by Mega Pagan.     Urszula Cummings MD    [facility] EMERGENCY DEPARTMENT  6/23/2025     Urszula Cummings MD  07/29/25  [id number]

## 2025-06-24 LAB
ATRIAL RATE - MUSE: 79 BPM
DIASTOLIC BLOOD PRESSURE - MUSE: NORMAL MMHG
INTERPRETATION ECG - MUSE: NORMAL
P AXIS - MUSE: 37 DEGREES
PR INTERVAL - MUSE: 128 MS
QRS DURATION - MUSE: 86 MS
QT - MUSE: 374 MS
QTC - MUSE: 428 MS
R AXIS - MUSE: 8 DEGREES
SYSTOLIC BLOOD PRESSURE - MUSE: NORMAL MMHG
T AXIS - MUSE: 40 DEGREES
VENTRICULAR RATE- MUSE: 79 BPM

## 2025-06-24 PROCEDURE — 250N000013 HC RX MED GY IP 250 OP 250 PS 637: Performed by: EMERGENCY MEDICINE

## 2025-06-24 RX ORDER — CIPROFLOXACIN 500 MG/1
500 TABLET, FILM COATED ORAL 2 TIMES DAILY
Qty: 6 TABLET | Refills: 0 | Status: SHIPPED | OUTPATIENT
Start: 2025-06-24

## 2025-06-24 RX ADMIN — SULFAMETHOXAZOLE AND TRIMETHOPRIM 1 TABLET: 800; 160 TABLET ORAL at 01:09

## 2025-06-24 ASSESSMENT — ACTIVITIES OF DAILY LIVING (ADL): ADLS_ACUITY_SCORE: 55
